# Patient Record
Sex: MALE | Race: WHITE | NOT HISPANIC OR LATINO | Employment: OTHER | URBAN - METROPOLITAN AREA
[De-identification: names, ages, dates, MRNs, and addresses within clinical notes are randomized per-mention and may not be internally consistent; named-entity substitution may affect disease eponyms.]

---

## 2019-01-22 ENCOUNTER — OFFICE VISIT (OUTPATIENT)
Dept: FAMILY MEDICINE | Facility: CLINIC | Age: 49
End: 2019-01-22
Payer: OTHER GOVERNMENT

## 2019-01-22 VITALS
WEIGHT: 240 LBS | HEART RATE: 85 BPM | DIASTOLIC BLOOD PRESSURE: 78 MMHG | RESPIRATION RATE: 18 BRPM | HEIGHT: 69 IN | SYSTOLIC BLOOD PRESSURE: 124 MMHG | BODY MASS INDEX: 35.55 KG/M2 | TEMPERATURE: 99 F

## 2019-01-22 DIAGNOSIS — J30.2 SEASONAL ALLERGIES: ICD-10-CM

## 2019-01-22 DIAGNOSIS — E66.09 CLASS 2 OBESITY DUE TO EXCESS CALORIES WITH BODY MASS INDEX (BMI) OF 35.0 TO 35.9 IN ADULT, UNSPECIFIED WHETHER SERIOUS COMORBIDITY PRESENT: ICD-10-CM

## 2019-01-22 DIAGNOSIS — Z80.42 FAMILY HISTORY OF PROSTATE CANCER: ICD-10-CM

## 2019-01-22 DIAGNOSIS — Z01.89 ENCOUNTER FOR ROUTINE LABORATORY TESTING: ICD-10-CM

## 2019-01-22 DIAGNOSIS — Z12.5 PROSTATE CANCER SCREENING: ICD-10-CM

## 2019-01-22 DIAGNOSIS — Z83.3 FAMILY HISTORY OF DIABETES MELLITUS (DM): ICD-10-CM

## 2019-01-22 DIAGNOSIS — F41.9 ANXIETY: ICD-10-CM

## 2019-01-22 DIAGNOSIS — Z12.11 COLON CANCER SCREENING: ICD-10-CM

## 2019-01-22 DIAGNOSIS — R03.0 PRE-HYPERTENSION: Primary | ICD-10-CM

## 2019-01-22 PROCEDURE — 99999 PR PBB SHADOW E&M-NEW PATIENT-LVL IV: ICD-10-PCS | Mod: PBBFAC,,, | Performed by: INTERNAL MEDICINE

## 2019-01-22 PROCEDURE — 99204 PR OFFICE/OUTPT VISIT, NEW, LEVL IV, 45-59 MIN: ICD-10-PCS | Mod: S$PBB,,, | Performed by: INTERNAL MEDICINE

## 2019-01-22 PROCEDURE — 99999 PR PBB SHADOW E&M-NEW PATIENT-LVL IV: CPT | Mod: PBBFAC,,, | Performed by: INTERNAL MEDICINE

## 2019-01-22 PROCEDURE — 99204 OFFICE O/P NEW MOD 45 MIN: CPT | Mod: PBBFAC,PN | Performed by: INTERNAL MEDICINE

## 2019-01-22 PROCEDURE — 99204 OFFICE O/P NEW MOD 45 MIN: CPT | Mod: S$PBB,,, | Performed by: INTERNAL MEDICINE

## 2019-01-22 NOTE — PATIENT INSTRUCTIONS
Pre-hypertension. Maintain < 2 Gm Na a day diet, and monitor BP; keep a log.  -     Comprehensive metabolic panel; Future; Expected date: 01/22/2019  -     TSH; Future; Expected date: 01/22/2019  -     T4, free; Future; Expected date: 01/22/2019  -     Urinalysis; Future; Expected date: 01/22/2019    Class 2 obesity due to excess calories with body mass index (BMI) of 35.0 to 35.9 in adult, unspecified whether serious comorbidity present; Caloric restriction w regular exercise and weight reduction.  -     Comprehensive metabolic panel; Future; Expected date: 01/22/2019  -     Lipid panel; Future; Expected date: 01/22/2019  -     TSH; Future; Expected date: 01/22/2019  -     T4, free; Future; Expected date: 01/22/2019  -     Hemoglobin A1c; Future; Expected date: 01/22/2019    Family history of diabetes mellitus (DM)  -     Comprehensive metabolic panel; Future; Expected date: 01/22/2019  -     Hemoglobin A1c; Future; Expected date: 01/22/2019  -     Urinalysis; Future; Expected date: 01/22/2019    Seasonal allergies; zyrtec for congestion as needed.    Anxiety; limit caffeine; exercise regularly w stress reduction.   -     TSH; Future; Expected date: 01/22/2019  -     T4, free; Future; Expected date: 01/22/2019    Encounter for routine laboratory testing  -     CBC auto differential; Future; Expected date: 01/22/2019  -     Comprehensive metabolic panel; Future; Expected date: 01/22/2019  -     Lipid panel; Future; Expected date: 01/22/2019  -     TSH; Future; Expected date: 01/22/2019  -     T4, free; Future; Expected date: 01/22/2019  -     Hemoglobin A1c; Future; Expected date: 01/22/2019  -     PSA, Screening; Future; Expected date: 01/22/2019    Colon cancer screening  -     CBC auto differential; Future; Expected date: 01/22/2019  -     Ambulatory consult to Gastroenterology Dr Abbasi for TC initial     Prostate cancer screening  -     PSA, Screening; Future; Expected date: 01/22/2019    Family history of  prostate cancer  -     PSA, Screening; Future; Expected date: 01/22/2019

## 2019-01-22 NOTE — PROGRESS NOTES
Subjective:       Patient ID: Sam Morse is a 48 y.o. male.    Chief Complaint: Establish Care    HPI  Here to get est w me as his PCP; previously was seeing Dr Swartz. PMH/surgical hx delineated and noted. SMH/FMH also delineated and noted.   ROS obtained at length prior to physical being performed. No medications exc low dose ASA almost daily. Occ zyrtec for seasonal allergies.  Situational anxiety; rarely uses med; suspects ativan low dose used rarely.   Seasonal allergies; not problem here lately; zyrtec helps.   Pre-HTN: watches his salt intake; BP here 124/78.  Obesity: BMI: 35.44; exercises at least 2x a week w [employer].   Labs last at [facility] 8/2017. Labs ordered for f/u and GI consult for Tc initial. Total time: 150 pm-258 pm; >50% time spent in discussion, counseling, and review.    Review of Systems   Constitutional: Negative for appetite change and unexpected weight change.   HENT: Negative for congestion, postnasal drip, rhinorrhea and sinus pressure.          seasonal allergies history,    Eyes: Negative for discharge and itching.   Respiratory: Negative for cough, chest tightness, shortness of breath and wheezing.    Cardiovascular: Negative for chest pain, palpitations and leg swelling.   Gastrointestinal: Negative for abdominal pain, blood in stool, constipation, diarrhea, nausea and vomiting.   Endocrine: Negative for polydipsia, polyphagia and polyuria.   Genitourinary: Negative for dysuria and hematuria.   Musculoskeletal: Negative for arthralgias and myalgias.   Skin: Negative for rash.   Allergic/Immunologic: Negative for environmental allergies and food allergies.   Neurological: Negative for tremors, seizures and headaches.   Hematological: Negative for adenopathy. Does not bruise/bleed easily.   Psychiatric/Behavioral:        Some anxiety but no depression.        Objective:      Vitals:    01/22/19 1353   BP: 124/78   Pulse: 85   Resp: 18   Temp: 98.9 °F (37.2 °C)   TempSrc: Oral  "  Weight: 108.8 kg (239 lb 15.5 oz)   Height: 5' 9" (1.753 m)     Body mass index is 35.44 kg/m².    Physical Exam   Constitutional: He is oriented to person, place, and time. He appears well-developed and well-nourished.   HENT:   Head: Normocephalic and atraumatic.   Eyes: EOM are normal.   Neck: Normal range of motion. Neck supple. No thyromegaly present.   No carotid bruits heard   Cardiovascular: Normal rate, regular rhythm and normal heart sounds. Exam reveals no gallop.   No murmur heard.  Pulmonary/Chest: Effort normal and breath sounds normal. No respiratory distress. He has no wheezes. He has no rales.   Abdominal: Soft. Bowel sounds are normal. He exhibits no distension. There is no tenderness. There is no rebound and no guarding.   Musculoskeletal: Normal range of motion. He exhibits no edema.   Lymphadenopathy:     He has no cervical adenopathy.   Neurological: He is alert and oriented to person, place, and time.   Moves all 4 extremities fine.   Skin: No rash noted.   Psychiatric: He has a normal mood and affect. His behavior is normal. Thought content normal.   Vitals reviewed.      Assessment:       1. Pre-hypertension    2. Class 2 obesity due to excess calories with body mass index (BMI) of 35.0 to 35.9 in adult, unspecified whether serious comorbidity present    3. Family history of diabetes mellitus (DM)    4. Seasonal allergies    5. Anxiety    6. Encounter for routine laboratory testing    7. Colon cancer screening    8. Prostate cancer screening    9. Family history of prostate cancer        Plan:       Pre-hypertension. Maintain < 2 Gm Na a day diet, and monitor BP; keep a log.  -     Comprehensive metabolic panel; Future; Expected date: 01/22/2019  -     TSH; Future; Expected date: 01/22/2019  -     T4, free; Future; Expected date: 01/22/2019  -     Urinalysis; Future; Expected date: 01/22/2019    Class 2 obesity due to excess calories with body mass index (BMI) of 35.0 to 35.9 in adult, " unspecified whether serious comorbidity present; Caloric restriction w regular exercise and weight reduction.  -     Comprehensive metabolic panel; Future; Expected date: 01/22/2019  -     Lipid panel; Future; Expected date: 01/22/2019  -     TSH; Future; Expected date: 01/22/2019  -     T4, free; Future; Expected date: 01/22/2019  -     Hemoglobin A1c; Future; Expected date: 01/22/2019    Family history of diabetes mellitus (DM)  -     Comprehensive metabolic panel; Future; Expected date: 01/22/2019  -     Hemoglobin A1c; Future; Expected date: 01/22/2019  -     Urinalysis; Future; Expected date: 01/22/2019    Seasonal allergies; zyrtec for congestion as needed.    Anxiety; limit caffeine; exercise regularly w stress reduction.   -     TSH; Future; Expected date: 01/22/2019  -     T4, free; Future; Expected date: 01/22/2019    Encounter for routine laboratory testing  -     CBC auto differential; Future; Expected date: 01/22/2019  -     Comprehensive metabolic panel; Future; Expected date: 01/22/2019  -     Lipid panel; Future; Expected date: 01/22/2019  -     TSH; Future; Expected date: 01/22/2019  -     T4, free; Future; Expected date: 01/22/2019  -     Hemoglobin A1c; Future; Expected date: 01/22/2019  -     PSA, Screening; Future; Expected date: 01/22/2019    Colon cancer screening  -     CBC auto differential; Future; Expected date: 01/22/2019  -     Ambulatory consult to Gastroenterology Dr Abbasi for TC initial     Prostate cancer screening  -     PSA, Screening; Future; Expected date: 01/22/2019    Family history of prostate cancer  -     PSA, Screening; Future; Expected date: 01/22/2019

## 2019-01-31 ENCOUNTER — TELEPHONE (OUTPATIENT)
Dept: FAMILY MEDICINE | Facility: CLINIC | Age: 49
End: 2019-01-31

## 2019-02-13 ENCOUNTER — LAB VISIT (OUTPATIENT)
Dept: LAB | Facility: HOSPITAL | Age: 49
End: 2019-02-13
Attending: INTERNAL MEDICINE
Payer: OTHER GOVERNMENT

## 2019-02-13 DIAGNOSIS — Z83.3 FAMILY HISTORY OF DIABETES MELLITUS (DM): ICD-10-CM

## 2019-02-13 DIAGNOSIS — R03.0 PRE-HYPERTENSION: ICD-10-CM

## 2019-02-13 LAB
BILIRUB UR QL STRIP: NEGATIVE
CLARITY UR REFRACT.AUTO: CLEAR
COLOR UR AUTO: YELLOW
GLUCOSE UR QL STRIP: NEGATIVE
HGB UR QL STRIP: NEGATIVE
KETONES UR QL STRIP: NEGATIVE
LEUKOCYTE ESTERASE UR QL STRIP: NEGATIVE
NITRITE UR QL STRIP: NEGATIVE
PH UR STRIP: 7 [PH] (ref 5–8)
PROT UR QL STRIP: NEGATIVE
SP GR UR STRIP: 1.02 (ref 1–1.03)
URN SPEC COLLECT METH UR: NORMAL

## 2019-02-13 PROCEDURE — 81003 URINALYSIS AUTO W/O SCOPE: CPT

## 2019-03-13 ENCOUNTER — OFFICE VISIT (OUTPATIENT)
Dept: FAMILY MEDICINE | Facility: CLINIC | Age: 49
End: 2019-03-13
Payer: OTHER GOVERNMENT

## 2019-03-13 VITALS
HEART RATE: 72 BPM | SYSTOLIC BLOOD PRESSURE: 130 MMHG | HEIGHT: 69 IN | BODY MASS INDEX: 35.7 KG/M2 | DIASTOLIC BLOOD PRESSURE: 74 MMHG | WEIGHT: 241.06 LBS

## 2019-03-13 DIAGNOSIS — R73.01 IMPAIRED FASTING BLOOD SUGAR: ICD-10-CM

## 2019-03-13 DIAGNOSIS — J30.2 SEASONAL ALLERGIES: ICD-10-CM

## 2019-03-13 DIAGNOSIS — F41.9 ANXIETY: ICD-10-CM

## 2019-03-13 DIAGNOSIS — Z83.3 FAMILY HISTORY OF DIABETES MELLITUS (DM): ICD-10-CM

## 2019-03-13 DIAGNOSIS — R03.0 PRE-HYPERTENSION: ICD-10-CM

## 2019-03-13 DIAGNOSIS — Z80.8 FAMILY HISTORY OF SKIN CANCER: ICD-10-CM

## 2019-03-13 DIAGNOSIS — E78.5 DYSLIPIDEMIA: ICD-10-CM

## 2019-03-13 DIAGNOSIS — E66.01 CLASS 2 SEVERE OBESITY WITH SERIOUS COMORBIDITY AND BODY MASS INDEX (BMI) OF 35.0 TO 35.9 IN ADULT, UNSPECIFIED OBESITY TYPE: ICD-10-CM

## 2019-03-13 DIAGNOSIS — Z01.89 ENCOUNTER FOR ROUTINE LABORATORY TESTING: ICD-10-CM

## 2019-03-13 DIAGNOSIS — E78.00 PURE HYPERCHOLESTEROLEMIA: Primary | ICD-10-CM

## 2019-03-13 PROCEDURE — 99999 PR PBB SHADOW E&M-EST. PATIENT-LVL III: CPT | Mod: PBBFAC,,, | Performed by: INTERNAL MEDICINE

## 2019-03-13 PROCEDURE — 99999 PR PBB SHADOW E&M-EST. PATIENT-LVL III: ICD-10-PCS | Mod: PBBFAC,,, | Performed by: INTERNAL MEDICINE

## 2019-03-13 PROCEDURE — 99214 PR OFFICE/OUTPT VISIT, EST, LEVL IV, 30-39 MIN: ICD-10-PCS | Mod: S$PBB,,, | Performed by: INTERNAL MEDICINE

## 2019-03-13 PROCEDURE — 99213 OFFICE O/P EST LOW 20 MIN: CPT | Mod: PBBFAC,PN | Performed by: INTERNAL MEDICINE

## 2019-03-13 PROCEDURE — 99214 OFFICE O/P EST MOD 30 MIN: CPT | Mod: S$PBB,,, | Performed by: INTERNAL MEDICINE

## 2019-03-13 RX ORDER — LORAZEPAM 0.5 MG/1
0.5 TABLET ORAL EVERY 12 HOURS PRN
COMMUNITY
End: 2023-07-21

## 2019-03-13 RX ORDER — BUSPIRONE HYDROCHLORIDE 15 MG/1
TABLET ORAL
Qty: 30 TABLET | Refills: 1 | Status: SHIPPED | OUTPATIENT
Start: 2019-03-13 | End: 2020-09-09

## 2019-03-13 NOTE — PROGRESS NOTES
Subjective:       Patient ID: Sam Morse is a 48 y.o. male.    Chief Complaint: Follow-up    HPI  patient here today for reassessment and go over his labs. Labs discussed w pt at length. Has gained 20# in last 18 mos; knows of need for regular exercise; goal 5x a week min 30 min.   Pre hypertension:  Has been advised to watch his salt intake and maintain less than 2 g sodium a day.  Blood pressure here today 130/74.  Anxiety:  Limit his caffeine intake.  Exercise regularly for stress reduction; has used ativan 0.5-1.0 mg BID as needed for anxiety; rarely uses; wulling to try buspar prn.   Pure hypercholesterolemia/ Dyslipidemia; mild reduction HDL noted on his lipid panel at 37.  Cholesterol 186 but .  Impaired fasting blood sugar:  Fasting blood sugar turned back 102 patient with a family medical history of diabetes of note.  Hemoglobin A1c returned back normal however at 5.4.  Obesity stage II with BMI 35.60; regular exercise 5 times a day with minimum 30 min has been advised along with caloric restriction to help his weight come down.  Seasoanl allergies; zyrtec helps a fair amount. Can use flonase if needed as well.   Colon cancer screening.  Has been referred to GI Dr Betancourt for initial total colonoscopy. [facility] neg for colon cancer.     Review of Systems   Constitutional: Negative for appetite change and unexpected weight change.   HENT: Negative for congestion, postnasal drip, rhinorrhea and sinus pressure.          seasonal allergies,    Eyes: Negative for discharge and itching.   Respiratory: Negative for cough, chest tightness, shortness of breath and wheezing.    Cardiovascular: Negative for chest pain, palpitations and leg swelling.   Gastrointestinal: Negative for abdominal pain, blood in stool, constipation, diarrhea, nausea and vomiting.   Endocrine: Negative for polydipsia, polyphagia and polyuria.   Genitourinary: Negative for dysuria and hematuria.   Musculoskeletal: Negative for  "arthralgias and myalgias.   Skin: Negative for rash.   Allergic/Immunologic: Positive for environmental allergies. Negative for food allergies.   Neurological: Negative for tremors, seizures and headaches.   Hematological: Negative for adenopathy. Does not bruise/bleed easily.   Psychiatric/Behavioral:        Doing fine w anxiety. Uses ativan rarely. Willing to try buspar.        Objective:      Vitals:    03/13/19 0956   BP: 130/74   Pulse: 72   Weight: 109.4 kg (241 lb 1.2 oz)   Height: 5' 9" (1.753 m)     Body mass index is 35.6 kg/m².    Physical Exam   Constitutional: He is oriented to person, place, and time. He appears well-developed and well-nourished.   HENT:   Head: Normocephalic and atraumatic.   TM's pink; throat pink; NM swollen, slightly inflamed; clear to yel-white mucus.    Eyes: EOM are normal.   Neck: Normal range of motion. Neck supple. No thyromegaly present.   Cardiovascular: Normal rate, regular rhythm and normal heart sounds. Exam reveals no gallop.   No murmur heard.  Pulmonary/Chest: Effort normal and breath sounds normal. No respiratory distress. He has no wheezes. He has no rales.   Abdominal: Soft. Bowel sounds are normal. He exhibits no distension. There is no tenderness. There is no rebound and no guarding.   Musculoskeletal: Normal range of motion. He exhibits no edema.   Lymphadenopathy:     He has no cervical adenopathy.   Neurological: He is alert and oriented to person, place, and time.   Moves all 4 extremities fine.   Skin: No rash noted.   Psychiatric: He has a normal mood and affect. His behavior is normal. Thought content normal.   Vitals reviewed.      Assessment:       1. Pure hypercholesterolemia    2. Dyslipidemia    3. Encounter for routine laboratory testing    4. Pre-hypertension    5. Anxiety    6. Impaired fasting blood sugar    7. Family history of diabetes mellitus (DM)    8. Class 2 severe obesity with serious comorbidity and body mass index (BMI) of 35.0 to 35.9 " in adult, unspecified obesity type    9. Seasonal allergies    10. Family history of skin cancer        Plan:       Pure hypercholesterolemia.Maintain low fat high fiber diet, exercise regularly.  -     Lipid panel; Future; Expected date: 03/13/2019    Dyslipidemia  -     Lipid panel; Future; Expected date: 03/13/2019    Encounter for routine laboratory testing    Pre-hypertension. Maintain < 2 Gm Na a day diet, and monitor BP ; keep a log.    Anxiety; exercise regularly and limit caffeine.   -     busPIRone (BUSPAR) 15 MG tablet; 1/3-1/2 of 15 mg po TID as needed for anxiety.  Dispense: 30 tablet; Refill: 1    Impaired fasting blood sugar. Exercise recommended with weight reduction and low carb diet; we'll follow hemoglobin A1c's with you periodically.  -     Glucose, fasting; Future; Expected date: 03/27/2019    Family history of diabetes mellitus (DM)    Class 2 severe obesity with serious comorbidity and body mass index (BMI) of 35.0 to 35.9 in adult, unspecified obesity type. Caloric restriction w regular exercise and weight reduction.    Seasonal allergies; zyrtec and flonase as directed for congestion; Simply Saline for irrigation    Family history of skin cancer  -     Ambulatory referral to Dermatology Dr AL[facility] Marie for evaluation and treatment if needed. F/u w him as directed; needs visit updated.

## 2019-03-13 NOTE — PATIENT INSTRUCTIONS
Pure hypercholesterolemia.Maintain low fat high fiber diet, exercise regularly.  -     Lipid panel; Future; Expected date: 03/13/2019    Dyslipidemia  -     Lipid panel; Future; Expected date: 03/13/2019    Encounter for routine laboratory testing    Pre-hypertension. Maintain < 2 Gm Na a day diet, and monitor BP ; keep a log.    Anxiety; exercise regularly and limit caffeine.   -     busPIRone (BUSPAR) 15 MG tablet; 1/3-1/2 of 15 mg po TID as needed for anxiety.  Dispense: 30 tablet; Refill: 1    Impaired fasting blood sugar. Exercise recommended with weight reduction and low carb diet; we'll follow hemoglobin A1c's with you periodically.  -     Glucose, fasting; Future; Expected date: 03/27/2019    Family history of diabetes mellitus (DM)    Class 2 severe obesity with serious comorbidity and body mass index (BMI) of 35.0 to 35.9 in adult, unspecified obesity type. Caloric restriction w regular exercise and weight reduction.    Seasonal allergies; zyrtec and flonase as directed for congestion; Simply Saline for irrigation    Family history of skin cancer  -     Ambulatory referral to Dermatology Dr [facility] Marie for evaluation and treatment if needed. F/u w him as directed; needs visit updated.

## 2019-06-17 ENCOUNTER — HOSPITAL ENCOUNTER (OUTPATIENT)
Facility: HOSPITAL | Age: 49
Discharge: HOME | End: 2019-06-17
Attending: INTERNAL MEDICINE | Admitting: INTERNAL MEDICINE
Payer: OTHER GOVERNMENT

## 2019-06-17 ENCOUNTER — ANESTHESIA EVENT (OUTPATIENT)
Dept: ENDOSCOPY | Facility: HOSPITAL | Age: 49
End: 2019-06-17
Payer: OTHER GOVERNMENT

## 2019-06-17 ENCOUNTER — ANESTHESIA (OUTPATIENT)
Dept: ENDOSCOPY | Facility: HOSPITAL | Age: 49
End: 2019-06-17
Payer: OTHER GOVERNMENT

## 2019-06-17 DIAGNOSIS — Z12.11 COLON CANCER SCREENING: ICD-10-CM

## 2019-06-17 PROCEDURE — 25000003 PHARM REV CODE 250: Mod: PO | Performed by: INTERNAL MEDICINE

## 2019-06-17 PROCEDURE — D9220A PRA ANESTHESIA: ICD-10-PCS | Mod: 33,ANES,, | Performed by: ANESTHESIOLOGY

## 2019-06-17 PROCEDURE — 37000009 HC ANESTHESIA EA ADD 15 MINS: Mod: PO | Performed by: INTERNAL MEDICINE

## 2019-06-17 PROCEDURE — 88305 TISSUE EXAM BY PATHOLOGIST: CPT | Mod: 26,,, | Performed by: PATHOLOGY

## 2019-06-17 PROCEDURE — 45385 COLONOSCOPY W/LESION REMOVAL: CPT | Mod: PO | Performed by: INTERNAL MEDICINE

## 2019-06-17 PROCEDURE — 45385 COLONOSCOPY W/LESION REMOVAL: CPT | Mod: 33,,, | Performed by: INTERNAL MEDICINE

## 2019-06-17 PROCEDURE — D9220A PRA ANESTHESIA: ICD-10-PCS | Mod: 33,CRNA,, | Performed by: NURSE ANESTHETIST, CERTIFIED REGISTERED

## 2019-06-17 PROCEDURE — 00811 ANES LWR INTST NDSC NOS: CPT | Mod: PO | Performed by: INTERNAL MEDICINE

## 2019-06-17 PROCEDURE — 45385 PR COLONOSCOPY,REMV LESN,SNARE: ICD-10-PCS | Mod: 33,,, | Performed by: INTERNAL MEDICINE

## 2019-06-17 PROCEDURE — 63600175 PHARM REV CODE 636 W HCPCS: Mod: PO | Performed by: NURSE ANESTHETIST, CERTIFIED REGISTERED

## 2019-06-17 PROCEDURE — 37000008 HC ANESTHESIA 1ST 15 MINUTES: Mod: PO | Performed by: INTERNAL MEDICINE

## 2019-06-17 PROCEDURE — D9220A PRA ANESTHESIA: Mod: 33,CRNA,, | Performed by: NURSE ANESTHETIST, CERTIFIED REGISTERED

## 2019-06-17 PROCEDURE — D9220A PRA ANESTHESIA: Mod: 33,ANES,, | Performed by: ANESTHESIOLOGY

## 2019-06-17 PROCEDURE — 88305 TISSUE EXAM BY PATHOLOGIST: CPT | Performed by: PATHOLOGY

## 2019-06-17 PROCEDURE — 27201089 HC SNARE, DISP (ANY): Mod: PO | Performed by: INTERNAL MEDICINE

## 2019-06-17 PROCEDURE — 88305 TISSUE SPECIMEN TO PATHOLOGY - SURGERY: ICD-10-PCS | Mod: 26,,, | Performed by: PATHOLOGY

## 2019-06-17 RX ORDER — LIDOCAINE HYDROCHLORIDE 10 MG/ML
1 INJECTION INFILTRATION; PERINEURAL ONCE
Status: COMPLETED | OUTPATIENT
Start: 2019-06-17 | End: 2019-06-17

## 2019-06-17 RX ORDER — LIDOCAINE HCL/PF 100 MG/5ML
SYRINGE (ML) INTRAVENOUS
Status: DISCONTINUED | OUTPATIENT
Start: 2019-06-17 | End: 2019-06-17

## 2019-06-17 RX ORDER — SODIUM CHLORIDE, SODIUM LACTATE, POTASSIUM CHLORIDE, CALCIUM CHLORIDE 600; 310; 30; 20 MG/100ML; MG/100ML; MG/100ML; MG/100ML
INJECTION, SOLUTION INTRAVENOUS CONTINUOUS
Status: DISCONTINUED | OUTPATIENT
Start: 2019-06-17 | End: 2019-06-17 | Stop reason: HOSPADM

## 2019-06-17 RX ORDER — PROPOFOL 10 MG/ML
VIAL (ML) INTRAVENOUS
Status: DISCONTINUED | OUTPATIENT
Start: 2019-06-17 | End: 2019-06-17

## 2019-06-17 RX ORDER — SODIUM CHLORIDE 0.9 % (FLUSH) 0.9 %
10 SYRINGE (ML) INJECTION
Status: DISCONTINUED | OUTPATIENT
Start: 2019-06-17 | End: 2019-06-17 | Stop reason: HOSPADM

## 2019-06-17 RX ADMIN — PROPOFOL 50 MG: 10 INJECTION, EMULSION INTRAVENOUS at 10:06

## 2019-06-17 RX ADMIN — PROPOFOL 100 MG: 10 INJECTION, EMULSION INTRAVENOUS at 10:06

## 2019-06-17 RX ADMIN — SODIUM CHLORIDE, SODIUM LACTATE, POTASSIUM CHLORIDE, AND CALCIUM CHLORIDE: .6; .31; .03; .02 INJECTION, SOLUTION INTRAVENOUS at 10:06

## 2019-06-17 RX ADMIN — LIDOCAINE HYDROCHLORIDE: 10 INJECTION, SOLUTION EPIDURAL; INFILTRATION; INTRACAUDAL; PERINEURAL at 10:06

## 2019-06-17 RX ADMIN — LIDOCAINE HYDROCHLORIDE 100 MG: 20 INJECTION, SOLUTION INTRAVENOUS at 10:06

## 2019-06-17 NOTE — PLAN OF CARE
Patient tolerating PO intake well. No distress/discomfort noted. Vital signs stable. Discharge instructions reviewed with patient/family. Verbalized understanding. Patient ready for discharge.

## 2019-06-17 NOTE — BRIEF OP NOTE
Discharge Note  Short Stay      SUMMARY     Admit Date: 6/17/2019    Attending Physician: Sylvain Abbasi Jr., MD     Discharge Physician: Sylvain Abbasi Jr., MD    Discharge Date: 6/17/2019 11:06 AM    Final Diagnosis: Screen for colon cancer [Z12.11]  Impression:          - Two 2 to 3 mm polyps in the distal sigmoid colon,                        removed with a cold snare. Resected and retrieved.                       - Mild colonic spasm consistent with irritable bowel                        syndrome.                       - Non-bleeding internal hemorrhoids.                       - The examination was otherwise normal.                       - The examined portion of the ileum was normal.  Recommendation:      - Discharge patient to home.                       - Await pathology results.                       - If the pathology report reveals adenomatous                        tissue, then repeat the colonoscopy for surveillance                        in 5 years.                       - If the pathology report indicates hyperplastic                        polyp, then repeat colonoscopy for surveillance in 7                        years.                       - High fiber diet.                       - Use fiber, for example Citrucel, Fibercon, Konsyl                        or Metamucil.                       - Call the [facility] clinic in 2 weeks for reports (if                        you haven't heard from us sooner) [phone].                       - Continue present medications.                       - Patient has a contact number available for                        emergencies. The signs and symptoms of potential                        delayed complications were discussed with the                        patient. Return to normal activities tomorrow.                        Written discharge instructions were provided to the                        patient.                       - Return to normal activities  tomorrow.  Sylvain Abbasi MD  6/17/2019  Disposition: HOME OR SELF CARE    Patient Instructions:   Current Discharge Medication List      CONTINUE these medications which have NOT CHANGED    Details   busPIRone (BUSPAR) 15 MG tablet 1/3-1/2 of 15 mg po TID as needed for anxiety.  Qty: 30 tablet, Refills: 1    Associated Diagnoses: Anxiety      LORazepam (ATIVAN) 0.5 MG tablet Take 0.5 mg by mouth every 12 (twelve) hours as needed for Anxiety.             Discharge Procedure Orders (must include Diet, Follow-up, Activity)    Follow Up:  Follow up with PCP as per your routine.  Please follow a high fiber diet.  Activity as tolerated.    No driving day of procedure.

## 2019-06-17 NOTE — H&P
History & Physical - Short Stay  Gastroenterology      SUBJECTIVE:     Procedure: Colonoscopy    Chief Complaint/Indication for Procedure: Screening    History of Present Illness:  Asymptomatic  Office Visit     1/22/2019  [facility]      Aidan Servin MD   Internal Medicine   Pre-hypertension +8 more   Dx   Establish Care ; Referred by Aaareferral Self   Reason for Visit           Progress Notes     Expand All Collapse All    Subjective:       Patient ID: Sam Morse is a 48 y.o. male.     Chief Complaint: Establish Care     HPI  Here to get est saba schulte as his PCP; previously was seeing Dr Swartz. PMH/surgical hx delineated and noted. SMH/FMH also delineated and noted.   ROS obtained at length prior to physical being performed. No medications exc low dose ASA almost daily. Occ zyrtec for seasonal allergies.  Situational anxiety; rarely uses med; suspects ativan low dose used rarely.   Seasonal allergies; not problem here lately; zyrtec helps.   Pre-HTN: watches his salt intake; BP here 124/78.  Obesity: BMI: 35.44; exercises at least 2x a week w [employer].   Labs last at [facility] 8/2017. Labs ordered for f/u and GI consult for Tc initial. Total time: 150 pm-258 pm; >50% time spent in discussion, counseling, and review.     Assessment:       1. Pre-hypertension    2. Class 2 obesity due to excess calories with body mass index (BMI) of 35.0 to 35.9 in adult, unspecified whether serious comorbidity present    3. Family history of diabetes mellitus (DM)    4. Seasonal allergies    5. Anxiety    6. Encounter for routine laboratory testing    7. Colon cancer screening    8. Prostate cancer screening    9. Family history of prostate cancer        Plan:       Pre-hypertension. Maintain < 2 Gm Na a day diet, and monitor BP; keep a log.  -     Comprehensive metabolic panel; Future; Expected date: 01/22/2019  -     TSH; Future; Expected date: 01/22/2019  -     T4, free; Future; Expected date:  01/22/2019  -     Urinalysis; Future; Expected date: 01/22/2019     Class 2 obesity due to excess calories with body mass index (BMI) of 35.0 to 35.9 in adult, unspecified whether serious comorbidity present; Caloric restriction w regular exercise and weight reduction.  -     Comprehensive metabolic panel; Future; Expected date: 01/22/2019  -     Lipid panel; Future; Expected date: 01/22/2019  -     TSH; Future; Expected date: 01/22/2019  -     T4, free; Future; Expected date: 01/22/2019  -     Hemoglobin A1c; Future; Expected date: 01/22/2019     Family history of diabetes mellitus (DM)  -     Comprehensive metabolic panel; Future; Expected date: 01/22/2019  -     Hemoglobin A1c; Future; Expected date: 01/22/2019  -     Urinalysis; Future; Expected date: 01/22/2019     Seasonal allergies; zyrtec for congestion as needed.     Anxiety; limit caffeine; exercise regularly w stress reduction.   -     TSH; Future; Expected date: 01/22/2019  -     T4, free; Future; Expected date: 01/22/2019     Encounter for routine laboratory testing  -     CBC auto differential; Future; Expected date: 01/22/2019  -     Comprehensive metabolic panel; Future; Expected date: 01/22/2019  -     Lipid panel; Future; Expected date: 01/22/2019  -     TSH; Future; Expected date: 01/22/2019  -     T4, free; Future; Expected date: 01/22/2019  -     Hemoglobin A1c; Future; Expected date: 01/22/2019  -     PSA, Screening; Future; Expected date: 01/22/2019     Colon cancer screening  -     CBC auto differential; Future; Expected date: 01/22/2019  -     Ambulatory consult to Gastroenterology Dr Abbasi for TC initial      Prostate cancer screening  -     PSA, Screening; Future; Expected date: 01/22/2019     Family history of prostate cancer  -     PSA, Screening; Future; Expected date: 01/22/2019             PTA Medications   Medication Sig    busPIRone (BUSPAR) 15 MG tablet 1/3-1/2 of 15 mg po TID as needed for anxiety.    LORazepam (ATIVAN) 0.5 MG  "tablet Take 0.5 mg by mouth every 12 (twelve) hours as needed for Anxiety.       Review of patient's allergies indicates:  No Known Allergies     Past Medical History:   Diagnosis Date    Anxiety     Obesity (BMI 30-39.9)     Pre-hypertension     Seasonal allergies      Past Surgical History:   Procedure Laterality Date    LASIK Bilateral 2012    VASECTOMY  [id number]    WISDOM TOOTH EXTRACTION  [id number]    all 4.      Family History   Problem Relation Age of Onset    Cancer Father         malignant melanoma; prostate cancer    Depression Father         anxiety w depression    Heart disease Father         CAD at 48-49 dx; coronary stent at 49.    Hypertension Father     Diabetes Paternal Grandmother     Hypertension Paternal Grandmother     Diabetes Paternal Grandfather     Cancer Paternal Grandfather         prostate cancer     Social History     Tobacco Use    Smoking status: Never Smoker    Smokeless tobacco: Never Used   Substance Use Topics    Alcohol use: Yes     Comment: rare occasions    Drug use: No         OBJECTIVE:     Vital Signs (Most Recent)  Temp: 97.5 °F (36.4 °C) (06/17/19 0948)  Pulse: (!) 56 (06/17/19 0948)  Resp: 16 (06/17/19 0948)  BP: 131/75 (06/17/19 0948)  SpO2: 96 % (06/17/19 0948)    Physical Exam:  :Ht 5' 9" (1.753 m)   Wt 108.8 kg (239 lb 15.5 oz)   BMI 35.44 kg/m²                                                         GENERAL:  Comfortable, in no acute distress.                      HEENT EXAM:  Nonicteric.  No adenopathy.  Oropharynx is clear.               NECK:  Supple.                                                               LUNGS:  Clear.                                                               CARDIAC:  Regular rate and rhythm.  S1, S2.  No murmur.  ABDOMEN:  Obese.  Soft, positive bowel sounds, nontender.  No hepatosplenomegaly or masses.  No rebound or guarding.     EXTREMITIES:  No edema.     MENTAL STATUS:  Alert and oriented.    ASSESSMENT/PLAN: "     Assessment: Colorectal cancer screening    Plan: Colonoscopy    Anesthesia Plan:   MAC / General Anaesthesia    ASA Grade: ASA 2 - Patient with mild systemic disease with no functional limitations    MALLAMPATI SCORE: II (hard and soft palate, upper portion of tonsils anduvula visible)

## 2019-06-17 NOTE — ANESTHESIA PREPROCEDURE EVALUATION
06/17/2019  Sam Morse is a 48 y.o., male.    Anesthesia Evaluation    I have reviewed the Patient Summary Reports.    I have reviewed the Nursing Notes.      Review of Systems  Anesthesia Hx:  No problems with previous Anesthesia    Cardiovascular:  Cardiovascular Normal     Pulmonary:  Pulmonary Normal        Physical Exam  General:  Well nourished, Obesity    Airway/Jaw/Neck:  Airway Findings: Mouth Opening: Normal Tongue: Normal  Mallampati: II  TM Distance: Normal, at least 6 cm  Jaw/Neck Findings:  Neck ROM: Normal ROM     Eyes/Ears/Nose:  Eyes/Ears/Nose Findings:    Dental:  Dental Findings: In tact   Chest/Lungs:  Chest/Lungs Findings: Normal Respiratory Rate     Heart/Vascular:  Heart Findings: Rate: Normal  Rhythm: Regular Rhythm        Mental Status:  Mental Status Findings:  Cooperative, Alert and Oriented         Anesthesia Plan  Type of Anesthesia, risks & benefits discussed:  Anesthesia Type:  general  Patient's Preference: General  Intra-op Monitoring Plan: standard ASA monitors  Intra-op Monitoring Plan Comments:   Post Op Pain Control Plan:   Post Op Pain Control Plan Comments:   Induction:   IV  Beta Blocker:  Patient is not currently on a Beta-Blocker (No further documentation required).       Informed Consent: Patient understands risks and agrees with Anesthesia plan.  Questions answered. Anesthesia consent signed with patient.  ASA Score: 2     Day of Surgery Review of History & Physical:    H&P update referred to the surgeon.         Ready For Surgery From Anesthesia Perspective.

## 2019-06-17 NOTE — [employer] PATIENT INSTRUCTIONS
Discharge Summary/Instructions for after Colonoscopy with   Biopsy/Polypectomy  Sam Morse    Monday, June 17, 2019  Sylvain Abbasi MD  RESTRICTIONS ON ACTIVITY:  - Do not drive a car or operate machinery until the day after the procedure.      - The following day: return to full activity including work.  - For  3 days: No heavy lifting, straining or running.  - Diet: You can have solid foods, but no gassy foods (i.e. beans, broccoli,   cabbage, etc).  TREATMENT FOR COMMON SIDE EFFECTS:  - Mild abdominal pain and bloating or excessive gas: rest, eat lightly and   use a heating pad.  SYMPTOMS TO WATCH FOR AND REPORT TO YOUR PHYSICIAN:  1. Severe abdominal pain.  2. Fever within 24 hours after a procedure.  3. A large amount of rectal bleeding. (A small amount of blood from the   rectum is not serious, especially if hemorrhoids are present.  3.  Because air was put into your colon during the procedure, expelling   large amounts of air from your rectum is normal.  4.  You may not have a bowel movement for 1-3 days because of the   colonoscopy prep.  This is normal.  5.  Call immediately if you notice any of the following:   Chills and/or fever over 101   Persistent vomiting   Severe abdominal pain, other than gas cramps   Severe chest pain   Black, tarry stools   Any bleeding - exceeding one tablespoon  Your doctor recommends these additional instructions:  We are waiting for your pathology results.   If the pathology report reveals adenomatous (precancerous) tissue, then your   physician recommends a repeat colonoscopy for surveillance in 5 years.   If the pathology report indicates a hyperplastic polyp, then the colonoscopy   will be repeated for surveillance in 7 years.   Eat a high fiber diet.   Take a fiber supplement, for example Citrucel, Fibercon, Konsyl or   Metamucil.   Call the [facility] clinic in 2 weeks for reports (if you haven't heard from us   sooner)  [phone].  None  If you have any questions  or problems, please call your physician.  EMERGENCY PHONE NUMBER: [phone]  LAB RESULTS: Call in two (2) weeks for lab results, [phone]  ___________________________________________  Nurse Signature  ___________________________________________  Patient/Designated Responsible Party Signature  Sylvain Abbasi MD  6/17/2019 11:04:28 AM  This report has been verified and signed electronically.  [employer]

## 2019-06-17 NOTE — DISCHARGE INSTRUCTIONS
Recovery After Procedural Sedation (Adult)  You have been given medicine by vein to make you sleep during your surgery. This may have included both a pain medicine and sleeping medicine. Most of the effects have worn off. But you may still have some drowsiness for the next 6 to 8 hours.  Home care  Follow these guidelines when you get home:  · For the next 8 hours, you should be watched by a responsible adult. This person should make sure your condition is not getting worse.  · Don't drink any alcohol for the next 24 hours.  · Don't drive, operate dangerous machinery, or make important business or personal decisions during the next 24 hours.  Note: Your healthcare provider may tell you not to take any medicine by mouth for pain or sleep in the next 4 hours. These medicines may react with the medicines you were given in the hospital. This could cause a much stronger response than usual.  Follow-up care  Follow up with your healthcare provider if you are not alert and back to your usual level of activity within 12 hours.  When to seek medical advice  Call your healthcare provider right away if any of these occur:  · Drowsiness gets worse  · Weakness or dizziness gets worse  · Repeated vomiting  · You can't be awakened   Date Last Reviewed: 10/18/2016  © 7044-5667 [employer]. [address] [place] [address], [place] [address]. All rights reserved. This information is not intended as a substitute for professional medical care. Always follow your healthcare professional's instructions.        High-Fiber Diet  Fiber is in fruits, vegetables, cereals, and grains. Fiber passes through your body undigested. A high-fiber diet helps food move through your intestinal tract. The added bulk is helpful in preventing constipation. In people with diverticulosis, fiber helps clean out the pouches along the colon wall. It also prevents new pouches from forming. A high-fiber diet reduces the risk of colon cancer. It also lowers  blood cholesterol and prevents high blood sugar in people with diabetes.    The fiber-rich foods listed below should be part of your diet. If you are not used to high-fiber foods, start with 1 or 2 foods from this list. Every 3 to 4 days add a new one to your diet. Do this until you are eating 4 high-fiber foods per day. This should give you 20 to 35 grams of fiber a day. It is also important to drink a lot of water when you are on this diet. You should have 6 to 8 glasses of water a day. Water makes the fiber swell and increases the benefit.  Foods high in dietary fiber  The following foods are high in dietary fiber:  · Breads. Breads made with 100% whole-wheat flour; jagdeep, wheat, or rye crackers; whole-grain tortillas, bran muffins.  · Cereals. Whole-grain and bran cereals with bran (shredded wheat, wheat flakes, raisin bran, corn bran); oatmeal, rolled oats, granola, and brown rice.  · Fruits. Fresh fruits and their edible skins (pears, prunes, raisins, berries, apples, and apricots); bananas, citrus fruit, mangoes, pineapple; and prune juice.  · Nuts. Any nuts and seeds.  · Vegetables. Best served raw or lightly cooked. All types, especially: green peas, celery, eggplant, potatoes, spinach, broccoli, Alpine sprouts, winter squash, carrots, cauliflower, soybeans, lentils, and fresh and dried beans of all kinds.  · Other. Popcorn, any spices.  Date Last Reviewed: 8/1/2016  © 7903-1590 [employer]. [address] [place] [address], [place] [address]. All rights reserved. This information is not intended as a substitute for professional medical care. Always follow your healthcare professional's instructions.

## 2019-06-17 NOTE — ANESTHESIA POSTPROCEDURE EVALUATION
Anesthesia Post Evaluation    Patient: Sam Morse    Procedure(s) Performed: Procedure(s) (LRB):  COLONOSCOPY (N/A)    Final Anesthesia Type: general  Patient location during evaluation: PACU  Patient participation: Yes- Able to Participate  Level of consciousness: awake and alert, oriented and awake  Post-procedure vital signs: reviewed and stable  Pain management: adequate  Airway patency: patent  PONV status at discharge: No PONV  Anesthetic complications: no      Cardiovascular status: blood pressure returned to baseline and hemodynamically stable  Respiratory status: unassisted, spontaneous ventilation and room air  Hydration status: euvolemic  Follow-up not needed.          Vitals Value Taken Time   /71 6/17/2019 11:28 AM   Temp 36.4 °C (97.6 °F) 6/17/2019 10:58 AM   Pulse 60 6/17/2019 11:28 AM   Resp 14 6/17/2019 11:28 AM   SpO2 98 % 6/17/2019 11:28 AM         Event Time     Out of Recovery 11:13:00          Pain/Abdoulaye Score: Abdoulaye Score: 10 (6/17/2019 11:28 AM)

## 2019-06-17 NOTE — TRANSFER OF CARE
"Anesthesia Transfer of Care Note    Patient: Sam Morse    Procedure(s) Performed: Procedure(s) (LRB):  COLONOSCOPY (N/A)    Patient location: PACU    Anesthesia Type: general    Transport from OR: Transported from OR on room air with adequate spontaneous ventilation    Post pain: adequate analgesia    Post assessment: no apparent anesthetic complications and tolerated procedure well    Post vital signs: stable    Level of consciousness: awake, alert and oriented    Nausea/Vomiting: no nausea/vomiting    Complications: none    Transfer of care protocol was followed      Last vitals:   Visit Vitals  /75 (BP Location: Right arm, Patient Position: Lying)   Pulse (!) 56   Temp 36.4 °C (97.5 °F) (Skin)   Resp 16   Ht 5' 8" (1.727 m)   Wt 102.1 kg (225 lb)   SpO2 96%   BMI 34.21 kg/m²     "

## 2019-06-18 VITALS
DIASTOLIC BLOOD PRESSURE: 71 MMHG | OXYGEN SATURATION: 98 % | HEIGHT: 68 IN | SYSTOLIC BLOOD PRESSURE: 129 MMHG | BODY MASS INDEX: 34.1 KG/M2 | TEMPERATURE: 98 F | HEART RATE: 60 BPM | WEIGHT: 225 LBS | RESPIRATION RATE: 14 BRPM

## 2019-09-23 ENCOUNTER — TELEPHONE (OUTPATIENT)
Dept: FAMILY MEDICINE | Facility: CLINIC | Age: 49
End: 2019-09-23

## 2019-09-23 DIAGNOSIS — Z80.8 FAMILY HISTORY OF SKIN CANCER: Primary | ICD-10-CM

## 2019-09-23 NOTE — TELEPHONE ENCOUNTER
----- Message from Qian Aleman sent at 9/23/2019  4:13 PM CDT -----  Contact: patient  Type: Needs Medical Advice    Who Called:  patient  Symptoms (please be specific):  na  How long has patient had these symptoms:  kerry  Pharmacy name and phone #:  [facility]  Best Call Back Number: [phone]  Additional Information: Patient states to please send referral to Dr. Marie's office Dermatology, so patient can make apt.  Please call to advise,Thanks!

## 2019-09-25 NOTE — TELEPHONE ENCOUNTER
Please notify patient that at the time of his visit on 03/13/2019 an external consult was placed to dermatologist Dr Josh Marie at that time for him to schedule appointment for regular evaluation; this can be found in the epic system.  Please call me back if I can be of any further assistance.  Please call their office to schedule an appointment for continue regular evaluations

## 2020-01-15 ENCOUNTER — OFFICE VISIT (OUTPATIENT)
Dept: FAMILY MEDICINE | Facility: CLINIC | Age: 50
End: 2020-01-15
Payer: OTHER GOVERNMENT

## 2020-01-15 VITALS
SYSTOLIC BLOOD PRESSURE: 134 MMHG | DIASTOLIC BLOOD PRESSURE: 86 MMHG | OXYGEN SATURATION: 97 % | BODY MASS INDEX: 36.55 KG/M2 | WEIGHT: 241.19 LBS | TEMPERATURE: 99 F | HEIGHT: 68 IN | HEART RATE: 79 BPM

## 2020-01-15 DIAGNOSIS — J30.2 SEASONAL ALLERGIES: ICD-10-CM

## 2020-01-15 DIAGNOSIS — F41.9 ANXIETY: ICD-10-CM

## 2020-01-15 DIAGNOSIS — Z12.11 COLON CANCER SCREENING: ICD-10-CM

## 2020-01-15 DIAGNOSIS — R73.01 IMPAIRED FASTING BLOOD SUGAR: ICD-10-CM

## 2020-01-15 DIAGNOSIS — Z12.5 PROSTATE CANCER SCREENING: ICD-10-CM

## 2020-01-15 DIAGNOSIS — D12.6 TUBULAR ADENOMA OF COLON: Primary | ICD-10-CM

## 2020-01-15 DIAGNOSIS — E78.5 DYSLIPIDEMIA: ICD-10-CM

## 2020-01-15 DIAGNOSIS — E66.01 CLASS 2 SEVERE OBESITY DUE TO EXCESS CALORIES WITH SERIOUS COMORBIDITY AND BODY MASS INDEX (BMI) OF 36.0 TO 36.9 IN ADULT: ICD-10-CM

## 2020-01-15 DIAGNOSIS — Z82.49 FAMILY HISTORY OF HEART DISEASE: ICD-10-CM

## 2020-01-15 DIAGNOSIS — L98.9 SKIN LESION OF CHEST WALL: ICD-10-CM

## 2020-01-15 DIAGNOSIS — R03.0 PRE-HYPERTENSION: ICD-10-CM

## 2020-01-15 DIAGNOSIS — E78.49 OTHER HYPERLIPIDEMIA: ICD-10-CM

## 2020-01-15 PROCEDURE — 99214 PR OFFICE/OUTPT VISIT, EST, LEVL IV, 30-39 MIN: ICD-10-PCS | Mod: S$PBB,,, | Performed by: INTERNAL MEDICINE

## 2020-01-15 PROCEDURE — 99999 PR PBB SHADOW E&M-EST. PATIENT-LVL III: ICD-10-PCS | Mod: PBBFAC,,, | Performed by: INTERNAL MEDICINE

## 2020-01-15 PROCEDURE — 99214 OFFICE O/P EST MOD 30 MIN: CPT | Mod: S$PBB,,, | Performed by: INTERNAL MEDICINE

## 2020-01-15 PROCEDURE — 99213 OFFICE O/P EST LOW 20 MIN: CPT | Mod: PBBFAC,PN | Performed by: INTERNAL MEDICINE

## 2020-01-15 PROCEDURE — 99999 PR PBB SHADOW E&M-EST. PATIENT-LVL III: CPT | Mod: PBBFAC,,, | Performed by: INTERNAL MEDICINE

## 2020-01-15 NOTE — PROGRESS NOTES
Subjective:       Patient ID: Sam Morse is a 49 y.o. male.    Chief Complaint: Follow-up    HPI  Here today for f/u.  Reassessment to include needed labs.  Labs not obtained for follow-up.  Left chest wall lesion w initial bx atypical in result; deeper excision done and f/u path pending; pt to f/u w derm for final pathology results. Stitches out in 10 days; w 6 mos f/u at 2020.  Encounter for routine labs ordered; chart reviewed  Other hyperlipidemia:  Aware the need for low-fat high-fiber diet regular exercise and weight reduction.  Lipid profile needs to be updated.  Dyslipidemia:  Low-fat high-fiber diet and regular exercise recommended.  Along with weight reduction.  Lipid profile to be repeated to reassess HDL as well on follow-up  Family history of heart disease:  Dad with a coronary stent at age 48-49.    Pre hypertension:  Blood pressure here 134/86 reportedly feeling good; knows he needs to watch his salt intake.  Blood pressure upon prior office review less than equal to 130 over 80  Colon cancer screenin2019 total colonoscopy by Dr. Abbasi; 2 sigmoid colon polyps removed.  Pathology:  Fragments of tubular adenoma and hyperplastic polyp; total colonoscopy to be repeated as per GI in 5 years.  Anxiety:  Overall doing a lot better; work is better; kit to doing better as well.  Knows to exercise regularly and limit caffeine  Prostate cancer screening:  After 2020 will need PSA repeated      Review of Systems   Constitutional: Negative for fever and unexpected weight change.   HENT: Negative for congestion, postnasal drip and rhinorrhea.    Respiratory: Negative for cough, chest tightness, shortness of breath and wheezing.    Cardiovascular: Negative for chest pain, palpitations and leg swelling.   Gastrointestinal: Negative for abdominal pain, blood in stool, constipation, diarrhea, nausea and vomiting.   Genitourinary: Negative for dysuria and hematuria.   Musculoskeletal: Negative  "for arthralgias and myalgias.   Skin: Negative for rash.   Allergic/Immunologic: Negative for environmental allergies and food allergies.   Neurological: Negative for syncope and weakness.   Psychiatric/Behavioral: Negative for dysphoric mood. The patient is not nervous/anxious.        Objective:      Vitals:    01/15/20 1035   BP: 134/86   BP Location: Left arm   Patient Position: Sitting   BP Method: Large (Manual)   Pulse: 79   Temp: 98.5 °F (36.9 °C)   TempSrc: Oral   SpO2: 97%   Weight: 109.4 kg (241 lb 2.9 oz)   Height: 5' 8" (1.727 m)     Body mass index is 36.67 kg/m².    Physical Exam   Constitutional: He is oriented to person, place, and time. He appears well-developed and well-nourished.   HENT:   Head: Normocephalic and atraumatic.   Eyes: EOM are normal.   Neck: Normal range of motion. Neck supple. No thyromegaly present.   No carotid bruits heard   Cardiovascular: Normal rate, regular rhythm and normal heart sounds. Exam reveals no gallop.   No murmur heard.  Pulmonary/Chest: Effort normal and breath sounds normal. No respiratory distress. He has no wheezes. He has no rales.   Abdominal: Soft. Bowel sounds are normal. He exhibits no distension. There is no tenderness. There is no rebound and no guarding.   Musculoskeletal: Normal range of motion. He exhibits no edema.   Lymphadenopathy:     He has no cervical adenopathy.   Neurological: He is alert and oriented to person, place, and time.   Moves all 4 extremities fine.   Skin: No rash noted.   Psychiatric: He has a normal mood and affect. His behavior is normal. Thought content normal.   Vitals reviewed.      Assessment:       1. Tubular adenoma of colon    2. Colon cancer screening    3. Skin lesion of chest wall    4. Prostate cancer screening    5. Seasonal allergies    6. Pre-hypertension    7. Anxiety    8. Other hyperlipidemia    9. Dyslipidemia    10. Family history of heart disease    11. Impaired fasting blood sugar    12. Class 2 severe " obesity due to excess calories with serious comorbidity and body mass index (BMI) of 36.0 to 36.9 in adult        Plan:       Tubular adenoma of colon on  path from TC 6/17/19  -     CBC auto differential; Future; Expected date: 01/15/2020    Colon cancer screening: TC due for repeat 6/17/2024 w Dr Abbasi. ASA 81 mg a day. High fiber diet.   -     CBC auto differential; Future; Expected date: 01/15/2020    Skin lesion of chest wall; to f/u w Dr ALF Marie for stitch removal in 10 days and follow up path report; f/u 6 mos apt 7/13/2020 as well. .   -     Ambulatory referral to Dermatology Dr ALF Marie    Prostate cancer screening  -     PSA, Screening; Future; Expected date: 01/15/2020    Seasonal allergies; zyrtec 10 mg a day for congestion as needed. Flonase for congestion as well as needed   -     CBC auto differential; Future; Expected date: 01/15/2020    Pre-hypertension.Maintain < 2 Gm Na a day diet, and monitor BP ; keep a log.  -     TSH; Future; Expected date: 01/15/2020    Anxiety.Limit caffeine intake with stress reduction and regular exercise as tolerated.    Other hyperlipidemia.Maintain low fat high fiber diet, exercise regularly. Weight reduction where indicated.  -     Comprehensive metabolic panel; Future; Expected date: 01/15/2020  -     Lipid panel; Future; Expected date: 01/15/2020  -     TSH; Future; Expected date: 01/15/2020    Dyslipidemia; as above.   -     Lipid panel; Future; Expected date: 01/15/2020    Family history of heart disease    Impaired fasting blood sugar.Exercise recommended with weight reduction and low carb diet; we'll follow hemoglobin A1c's with you periodically.  -     Comprehensive metabolic panel; Future; Expected date: 01/15/2020  -     Hemoglobin A1c; Future; Expected date: 01/15/2020  -     Urinalysis; Future; Expected date: 01/15/2020  -     Cancel: Hemoglobin A1c; Future; Expected date: 01/15/2020  -     TSH; Future; Expected date: 01/15/2020    Class 2 severe obesity due  to excess calories with serious comorbidity and body mass index (BMI) of 36.0 to 36.9 in adult.Caloric restriction w regular exercise and weight reduction.  -     Comprehensive metabolic panel; Future; Expected date: 01/15/2020  -     Hemoglobin A1c; Future; Expected date: 01/15/2020  -     Cancel: Hemoglobin A1c; Future; Expected date: 01/15/2020  -     TSH; Future; Expected date: 01/15/2020

## 2020-01-15 NOTE — PATIENT INSTRUCTIONS
Tubular adenoma of colon on  path from TC 6/17/19  -     CBC auto differential; Future; Expected date: 01/15/2020    Colon cancer screening: TC due for repeat 6/17/2024 w Dr Abbasi. ASA 81 mg a day. High fiber diet.   -     CBC auto differential; Future; Expected date: 01/15/2020    Skin lesion of chest wall; to f/u w Dr ALF Marie for stitch removal in 10 days and follow up path report; f/u 6 mos apt 7/13/2020 as well. .   -     Ambulatory referral to Dermatology Dr ALF Marie    Prostate cancer screening  -     PSA, Screening; Future; Expected date: 01/15/2020    Seasonal allergies; zyrtec 10 mg a day for congestion as needed. Flonase for congestion as well as needed   -     CBC auto differential; Future; Expected date: 01/15/2020    Pre-hypertension.Maintain < 2 Gm Na a day diet, and monitor BP ; keep a log.  -     TSH; Future; Expected date: 01/15/2020    Anxiety.Limit caffeine intake with stress reduction and regular exercise as tolerated.    Other hyperlipidemia.Maintain low fat high fiber diet, exercise regularly. Weight reduction where indicated.  -     Comprehensive metabolic panel; Future; Expected date: 01/15/2020  -     Lipid panel; Future; Expected date: 01/15/2020  -     TSH; Future; Expected date: 01/15/2020    Dyslipidemia; as above.   -     Lipid panel; Future; Expected date: 01/15/2020    Family history of heart disease    Impaired fasting blood sugar.Exercise recommended with weight reduction and low carb diet; we'll follow hemoglobin A1c's with you periodically.  -     Comprehensive metabolic panel; Future; Expected date: 01/15/2020  -     Hemoglobin A1c; Future; Expected date: 01/15/2020  -     Urinalysis; Future; Expected date: 01/15/2020  -     Cancel: Hemoglobin A1c; Future; Expected date: 01/15/2020  -     TSH; Future; Expected date: 01/15/2020    Class 2 severe obesity due to excess calories with serious comorbidity and body mass index (BMI) of 36.0 to 36.9 in adult.Caloric restriction w regular  exercise and weight reduction.  -     Comprehensive metabolic panel; Future; Expected date: 01/15/2020  -     Hemoglobin A1c; Future; Expected date: 01/15/2020  -     Cancel: Hemoglobin A1c; Future; Expected date: 01/15/2020  -     TSH; Future; Expected date: 01/15/2020

## 2020-02-19 ENCOUNTER — LAB VISIT (OUTPATIENT)
Dept: LAB | Facility: HOSPITAL | Age: 50
End: 2020-02-19
Attending: INTERNAL MEDICINE
Payer: OTHER GOVERNMENT

## 2020-02-19 DIAGNOSIS — E66.01 CLASS 2 SEVERE OBESITY DUE TO EXCESS CALORIES WITH SERIOUS COMORBIDITY AND BODY MASS INDEX (BMI) OF 36.0 TO 36.9 IN ADULT: ICD-10-CM

## 2020-02-19 DIAGNOSIS — Z12.5 PROSTATE CANCER SCREENING: ICD-10-CM

## 2020-02-19 DIAGNOSIS — R03.0 PRE-HYPERTENSION: ICD-10-CM

## 2020-02-19 DIAGNOSIS — D12.6 TUBULAR ADENOMA OF COLON: ICD-10-CM

## 2020-02-19 DIAGNOSIS — E78.5 DYSLIPIDEMIA: ICD-10-CM

## 2020-02-19 DIAGNOSIS — E78.49 OTHER HYPERLIPIDEMIA: ICD-10-CM

## 2020-02-19 DIAGNOSIS — Z12.11 COLON CANCER SCREENING: ICD-10-CM

## 2020-02-19 DIAGNOSIS — J30.2 SEASONAL ALLERGIES: ICD-10-CM

## 2020-02-19 DIAGNOSIS — R73.01 IMPAIRED FASTING BLOOD SUGAR: ICD-10-CM

## 2020-02-19 LAB
ALBUMIN SERPL BCP-MCNC: 4.1 G/DL (ref 3.5–5.2)
ALP SERPL-CCNC: 58 U/L (ref 55–135)
ALT SERPL W/O P-5'-P-CCNC: 46 U/L (ref 10–44)
ANION GAP SERPL CALC-SCNC: 9 MMOL/L (ref 8–16)
AST SERPL-CCNC: 28 U/L (ref 10–40)
BASOPHILS # BLD AUTO: 0.03 K/UL (ref 0–0.2)
BASOPHILS NFR BLD: 0.5 % (ref 0–1.9)
BILIRUB SERPL-MCNC: 0.6 MG/DL (ref 0.1–1)
BUN SERPL-MCNC: 13 MG/DL (ref 6–20)
CALCIUM SERPL-MCNC: 9 MG/DL (ref 8.7–10.5)
CHLORIDE SERPL-SCNC: 106 MMOL/L (ref 95–110)
CHOLEST SERPL-MCNC: 179 MG/DL (ref 120–199)
CHOLEST/HDLC SERPL: 4.2 {RATIO} (ref 2–5)
CO2 SERPL-SCNC: 25 MMOL/L (ref 23–29)
COMPLEXED PSA SERPL-MCNC: 0.54 NG/ML (ref 0–4)
CREAT SERPL-MCNC: 1.1 MG/DL (ref 0.5–1.4)
DIFFERENTIAL METHOD: ABNORMAL
EOSINOPHIL # BLD AUTO: 0.1 K/UL (ref 0–0.5)
EOSINOPHIL NFR BLD: 1.8 % (ref 0–8)
ERYTHROCYTE [DISTWIDTH] IN BLOOD BY AUTOMATED COUNT: 12.1 % (ref 11.5–14.5)
EST. GFR  (AFRICAN AMERICAN): >60 ML/MIN/1.73 M^2
EST. GFR  (NON AFRICAN AMERICAN): >60 ML/MIN/1.73 M^2
ESTIMATED AVG GLUCOSE: 114 MG/DL (ref 68–131)
GLUCOSE SERPL-MCNC: 101 MG/DL (ref 70–110)
HBA1C MFR BLD HPLC: 5.6 % (ref 4–5.6)
HCT VFR BLD AUTO: 51.4 % (ref 40–54)
HDLC SERPL-MCNC: 43 MG/DL (ref 40–75)
HDLC SERPL: 24 % (ref 20–50)
HGB BLD-MCNC: 16.3 G/DL (ref 14–18)
IMM GRANULOCYTES # BLD AUTO: 0.02 K/UL (ref 0–0.04)
IMM GRANULOCYTES NFR BLD AUTO: 0.4 % (ref 0–0.5)
LDLC SERPL CALC-MCNC: 110 MG/DL (ref 63–159)
LYMPHOCYTES # BLD AUTO: 2 K/UL (ref 1–4.8)
LYMPHOCYTES NFR BLD: 35.7 % (ref 18–48)
MCH RBC QN AUTO: 30.3 PG (ref 27–31)
MCHC RBC AUTO-ENTMCNC: 31.7 G/DL (ref 32–36)
MCV RBC AUTO: 96 FL (ref 82–98)
MONOCYTES # BLD AUTO: 0.4 K/UL (ref 0.3–1)
MONOCYTES NFR BLD: 7.1 % (ref 4–15)
NEUTROPHILS # BLD AUTO: 3 K/UL (ref 1.8–7.7)
NEUTROPHILS NFR BLD: 54.5 % (ref 38–73)
NONHDLC SERPL-MCNC: 136 MG/DL
NRBC BLD-RTO: 0 /100 WBC
PLATELET # BLD AUTO: 195 K/UL (ref 150–350)
PMV BLD AUTO: 11.4 FL (ref 9.2–12.9)
POTASSIUM SERPL-SCNC: 4.8 MMOL/L (ref 3.5–5.1)
PROT SERPL-MCNC: 6.8 G/DL (ref 6–8.4)
RBC # BLD AUTO: 5.38 M/UL (ref 4.6–6.2)
SODIUM SERPL-SCNC: 140 MMOL/L (ref 136–145)
TRIGL SERPL-MCNC: 130 MG/DL (ref 30–150)
TSH SERPL DL<=0.005 MIU/L-ACNC: 1.26 UIU/ML (ref 0.4–4)
WBC # BLD AUTO: 5.49 K/UL (ref 3.9–12.7)

## 2020-02-19 PROCEDURE — 85025 COMPLETE CBC W/AUTO DIFF WBC: CPT

## 2020-02-19 PROCEDURE — 84153 ASSAY OF PSA TOTAL: CPT

## 2020-02-19 PROCEDURE — 80061 LIPID PANEL: CPT

## 2020-02-19 PROCEDURE — 83036 HEMOGLOBIN GLYCOSYLATED A1C: CPT

## 2020-02-19 PROCEDURE — 36415 COLL VENOUS BLD VENIPUNCTURE: CPT | Mod: PN

## 2020-02-19 PROCEDURE — 80053 COMPREHEN METABOLIC PANEL: CPT

## 2020-02-19 PROCEDURE — 84443 ASSAY THYROID STIM HORMONE: CPT

## 2020-04-08 ENCOUNTER — TELEPHONE (OUTPATIENT)
Dept: FAMILY MEDICINE | Facility: CLINIC | Age: 50
End: 2020-04-08

## 2020-04-08 ENCOUNTER — PATIENT MESSAGE (OUTPATIENT)
Dept: FAMILY MEDICINE | Facility: CLINIC | Age: 50
End: 2020-04-08

## 2020-04-15 ENCOUNTER — OFFICE VISIT (OUTPATIENT)
Dept: FAMILY MEDICINE | Facility: CLINIC | Age: 50
End: 2020-04-15
Payer: OTHER GOVERNMENT

## 2020-04-15 ENCOUNTER — TELEPHONE (OUTPATIENT)
Dept: FAMILY MEDICINE | Facility: CLINIC | Age: 50
End: 2020-04-15

## 2020-04-15 DIAGNOSIS — E78.5 DYSLIPIDEMIA: ICD-10-CM

## 2020-04-15 DIAGNOSIS — F41.9 ANXIETY: ICD-10-CM

## 2020-04-15 DIAGNOSIS — Z12.5 PROSTATE CANCER SCREENING: ICD-10-CM

## 2020-04-15 DIAGNOSIS — Z80.42 FAMILY HISTORY OF PROSTATE CANCER IN FATHER: ICD-10-CM

## 2020-04-15 DIAGNOSIS — E66.01 CLASS 2 SEVERE OBESITY DUE TO EXCESS CALORIES WITH SERIOUS COMORBIDITY AND BODY MASS INDEX (BMI) OF 36.0 TO 36.9 IN ADULT: ICD-10-CM

## 2020-04-15 DIAGNOSIS — Z82.49 FAMILY HISTORY OF HEART DISEASE IN MALE FAMILY MEMBER BEFORE AGE 55: ICD-10-CM

## 2020-04-15 DIAGNOSIS — R74.01 TRANSAMINITIS: ICD-10-CM

## 2020-04-15 DIAGNOSIS — Z01.89 ENCOUNTER FOR LABORATORY TEST: ICD-10-CM

## 2020-04-15 DIAGNOSIS — R73.01 IMPAIRED FASTING BLOOD SUGAR: ICD-10-CM

## 2020-04-15 DIAGNOSIS — E78.49 OTHER HYPERLIPIDEMIA: Primary | ICD-10-CM

## 2020-04-15 PROCEDURE — 99214 OFFICE O/P EST MOD 30 MIN: CPT | Mod: 95,,, | Performed by: INTERNAL MEDICINE

## 2020-04-15 PROCEDURE — 99214 PR OFFICE/OUTPT VISIT, EST, LEVL IV, 30-39 MIN: ICD-10-PCS | Mod: 95,,, | Performed by: INTERNAL MEDICINE

## 2020-04-15 NOTE — PROGRESS NOTES
Subjective:      The patient location is: home  The chief complaint leading to consultation is:  Reassessment and go over his labs.   Visit type: audiovisual  Total time spent with patient:  3:02 p.m. Till 3:27 p.m. greater than 50% of the time spent in discussion, counseling, and review.  Each patient to whom he or she provides medical services by telemedicine is:  (1) informed of the relationship between the physician and patient and the respective role of any other health care provider with respect to management of the patient; and (2) notified that he or she may decline to receive medical services by telemedicine and may withdraw from such care at any time.    Notes:  Please see below     Patient ID: Sam Morse is a 49 y.o. male.    Chief Complaint:  Here for reassessment and go over his labs    HPI  Patient here today for reassessment and review and discussion of his labs.  Labs reviewed with patient at length and ordered for follow-up.     Anxiety:  Works at home in the computer; BuSpar has been used a couple of times for anxiety and helps.  He has no depression.  He exercises regularly and limits his caffeine intake.     Other hyperlipidemia/dyslipidemia:  On low-fat high-fiber diet or at least tries keeps active physically; total cholesterol 179/triglyceride 130/HDL 43 (up slightly as previously 37);  (129 prior).  Recommended tightening diet and exercise regimen to help lose weight as well.     Family history of heart disease involving his dad who had a coronary stent placed at age 49..     Impaired fasting blood sugar:  Limit his carbohydrate or least tries; recent fasting blood sugar 101 with hemoglobin A1c 5.6.     Class 2 obesity:  Weight has been about the same to slightly reduced.  On 01/15/2020 BMI 36.67 with weight 241 lb and 3 oz     Transaminitis:  Very minimum elevation of ALT to 46 previously was normal at 44.  Alcohol is rare only 2 drinks per week.  Members the time of last blood  work patient was at [employer] and may not have been hydrated enough.  Wishes to hold on any workup for now.  May also have a component of early fatty liver.  Stressed importance of keeping well hydrated with working out and physical activity as well as limiting Motrin use.     Prostate cancer screenin2020 PSA normal at 0.54; family history of prostate cancer involving his dad..    Review of Systems   Constitutional: Negative for fever and unexpected weight change.   HENT: Negative for congestion, postnasal drip and rhinorrhea.    Respiratory: Negative for cough, chest tightness, shortness of breath and wheezing.    Cardiovascular: Negative for chest pain, palpitations and leg swelling.   Gastrointestinal: Negative for abdominal pain, blood in stool, constipation, diarrhea, nausea and vomiting.   Genitourinary: Negative for dysuria and hematuria.   Musculoskeletal: Negative for arthralgias and myalgias.   Psychiatric/Behavioral: Negative for dysphoric mood. The patient is not nervous/anxious.        Objective:      There were no vitals filed for this visit.  There is no height or weight on file to calculate BMI.  Wt Readings from Last 3 Encounters:   01/15/20 109.4 kg (241 lb 2.9 oz)   19 102.1 kg (225 lb)   19 109.4 kg (241 lb 1.2 oz)        Physical Exam   Constitutional: He is oriented to person, place, and time. He appears well-developed and well-nourished. No distress.   No apparent distress; answers questions appropriately with good thought content   HENT:   Head: Normocephalic and atraumatic.   Eyes: EOM are normal.   Pulmonary/Chest: Effort normal. No respiratory distress.   Breathing comfortably with no signs of respiratory distress   Musculoskeletal: Normal range of motion.   Neurological: He is alert and oriented to person, place, and time.   Skin: He is not diaphoretic.   Psychiatric: He has a normal mood and affect. His behavior is normal.       Assessment:       1. Other  hyperlipidemia    2. Dyslipidemia    3. Family history of heart disease in male family member before age 55    4. Impaired fasting blood sugar    5. Anxiety    6. Class 2 severe obesity due to excess calories with serious comorbidity and body mass index (BMI) of 36.0 to 36.9 in adult    7. Transaminitis    8. Prostate cancer screening    9. Family history of prostate cancer in father    10. Encounter for laboratory test        Plan:       Other hyperlipidemia: Maintain low fat high fiber diet, exercise regularly. Weight reduction where indicated.  -     Comprehensive metabolic panel; Future; Expected date: 04/15/2020  -     Lipid panel; Future; Expected date: 04/15/2020    Dyslipidemia: Maintain low fat high fiber diet, exercise regularly. Weight reduction where indicated.-     Comprehensive metabolic panel; Future; Expected date: 04/15/2020  -     Lipid panel; Future; Expected date: 04/15/2020    Family history of heart disease in male family member before age 55    Impaired fasting blood sugar: Exercise recommended with weight reduction and low carb diet; we'll follow hemoglobin A1c's with you periodically.  -     Comprehensive metabolic panel; Future; Expected date: 04/15/2020  -     Hemoglobin A1c; Future; Expected date: 04/15/2020  -     Urinalysis; Future; Expected date: 04/15/2020    Anxiety: Limit caffeine intake with stress reduction and regular exercise as tolerated.    Class 2 severe obesity due to excess calories with serious comorbidity and body mass index (BMI) of 36.0 to 36.9 in adult: Caloric restriction w regular exercise and weight reduction.  -     Comprehensive metabolic panel; Future; Expected date: 04/15/2020  -     Hemoglobin A1c; Future; Expected date: 04/15/2020    Transaminitis:  Keep well hydrated especially when exerting physical activity outside; limit NSA ID use like Aleve/ibuprofen/Motrin, and Tylenol use.  Regular exercise and weight reduction will also help  -     Comprehensive  metabolic panel; Future; Expected date: 04/15/2020    Prostate cancer screening:  PSA due after 02/19/2021.  02/19/2020 PSA normal at 0.54.    Family history of prostate cancer in father    Encounter for laboratory test  -     Comprehensive metabolic panel; Future; Expected date: 04/15/2020  -     Lipid panel; Future; Expected date: 04/15/2020  -     Hemoglobin A1c; Future; Expected date: 04/15/2020  -     Urinalysis; Future; Expected date: 04/15/2020

## 2020-04-15 NOTE — PATIENT INSTRUCTIONS
Other hyperlipidemia: Maintain low fat high fiber diet, exercise regularly. Weight reduction where indicated.  -     Comprehensive metabolic panel; Future; Expected date: 04/15/2020  -     Lipid panel; Future; Expected date: 04/15/2020    Dyslipidemia: Maintain low fat high fiber diet, exercise regularly. Weight reduction where indicated.-     Comprehensive metabolic panel; Future; Expected date: 04/15/2020  -     Lipid panel; Future; Expected date: 04/15/2020    Family history of heart disease in male family member before age 55    Impaired fasting blood sugar: Exercise recommended with weight reduction and low carb diet; we'll follow hemoglobin A1c's with you periodically.  -     Comprehensive metabolic panel; Future; Expected date: 04/15/2020  -     Hemoglobin A1c; Future; Expected date: 04/15/2020  -     Urinalysis; Future; Expected date: 04/15/2020    Anxiety: Limit caffeine intake with stress reduction and regular exercise as tolerated.    Class 2 severe obesity due to excess calories with serious comorbidity and body mass index (BMI) of 36.0 to 36.9 in adult: Caloric restriction w regular exercise and weight reduction.  -     Comprehensive metabolic panel; Future; Expected date: 04/15/2020  -     Hemoglobin A1c; Future; Expected date: 04/15/2020    Transaminitis:  Keep well hydrated especially when exerting physical activity outside; limit NSA ID use like Aleve/ibuprofen/Motrin, and Tylenol use.  Regular exercise and weight reduction will also help  -     Comprehensive metabolic panel; Future; Expected date: 04/15/2020    Prostate cancer screening:  PSA due after 02/19/2021.  02/19/2020 PSA normal at 0.54.    Family history of prostate cancer in father    Encounter for laboratory test  -     Comprehensive metabolic panel; Future; Expected date: 04/15/2020  -     Lipid panel; Future; Expected date: 04/15/2020  -     Hemoglobin A1c; Future; Expected date: 04/15/2020  -     Urinalysis; Future; Expected date:  04/15/2020

## 2020-04-15 NOTE — TELEPHONE ENCOUNTER
Patient's virtual visit for today is been completed.  Please notify patient that his after visit summary is available in his portal for his review.  Please schedule follow-up appointment in 4 months with us here in the office.  Please have him obtain his labs after an overnight fast 1 week before his follow-up

## 2020-07-31 ENCOUNTER — TELEPHONE (OUTPATIENT)
Dept: FAMILY MEDICINE | Facility: CLINIC | Age: 50
End: 2020-07-31

## 2020-07-31 ENCOUNTER — LAB VISIT (OUTPATIENT)
Dept: PRIMARY CARE CLINIC | Facility: OTHER | Age: 50
End: 2020-07-31
Attending: INTERNAL MEDICINE
Payer: OTHER GOVERNMENT

## 2020-07-31 DIAGNOSIS — Z03.818 ENCOUNTER FOR OBSERVATION FOR SUSPECTED EXPOSURE TO OTHER BIOLOGICAL AGENTS RULED OUT: ICD-10-CM

## 2020-07-31 PROCEDURE — U0003 INFECTIOUS AGENT DETECTION BY NUCLEIC ACID (DNA OR RNA); SEVERE ACUTE RESPIRATORY SYNDROME CORONAVIRUS 2 (SARS-COV-2) (CORONAVIRUS DISEASE [COVID-19]), AMPLIFIED PROBE TECHNIQUE, MAKING USE OF HIGH THROUGHPUT TECHNOLOGIES AS DESCRIBED BY CMS-2020-01-R: HCPCS

## 2020-07-31 NOTE — TELEPHONE ENCOUNTER
----- Message from Janet Ortega sent at 7/31/2020  8:16 AM CDT -----  Type: Needs Medical Advice  Who Called:  Patient   Best Call Back Number:   Additional Information: Per patient asymptomatic, requesting a COVID test-please advise-thank you

## 2020-07-31 NOTE — TELEPHONE ENCOUNTER
Spoke with pt, he states his kids were possibly exposed to someone who is COVID positive and he had them tested and is requesting a test for himself. He is asymptomatic, referred him to community site.

## 2020-08-04 LAB — SARS-COV-2 RNA RESP QL NAA+PROBE: NORMAL

## 2020-09-02 ENCOUNTER — TELEPHONE (OUTPATIENT)
Dept: FAMILY MEDICINE | Facility: CLINIC | Age: 50
End: 2020-09-02

## 2020-09-02 NOTE — TELEPHONE ENCOUNTER
Called to reschedule appt that was canceled due to weather, lab appointment and appt with provider made.

## 2020-09-04 ENCOUNTER — LAB VISIT (OUTPATIENT)
Dept: LAB | Facility: HOSPITAL | Age: 50
End: 2020-09-04
Attending: INTERNAL MEDICINE
Payer: OTHER GOVERNMENT

## 2020-09-04 DIAGNOSIS — E66.01 CLASS 2 SEVERE OBESITY DUE TO EXCESS CALORIES WITH SERIOUS COMORBIDITY AND BODY MASS INDEX (BMI) OF 36.0 TO 36.9 IN ADULT: ICD-10-CM

## 2020-09-04 DIAGNOSIS — R74.01 TRANSAMINITIS: ICD-10-CM

## 2020-09-04 DIAGNOSIS — R73.01 IMPAIRED FASTING BLOOD SUGAR: ICD-10-CM

## 2020-09-04 DIAGNOSIS — E78.5 DYSLIPIDEMIA: ICD-10-CM

## 2020-09-04 DIAGNOSIS — Z01.89 ENCOUNTER FOR LABORATORY TEST: ICD-10-CM

## 2020-09-04 DIAGNOSIS — E78.49 OTHER HYPERLIPIDEMIA: ICD-10-CM

## 2020-09-04 LAB
ALBUMIN SERPL BCP-MCNC: 4.1 G/DL (ref 3.5–5.2)
ALP SERPL-CCNC: 56 U/L (ref 55–135)
ALT SERPL W/O P-5'-P-CCNC: 32 U/L (ref 10–44)
ANION GAP SERPL CALC-SCNC: 6 MMOL/L (ref 8–16)
AST SERPL-CCNC: 22 U/L (ref 10–40)
BILIRUB SERPL-MCNC: 0.3 MG/DL (ref 0.1–1)
BUN SERPL-MCNC: 12 MG/DL (ref 6–20)
CALCIUM SERPL-MCNC: 8.5 MG/DL (ref 8.7–10.5)
CHLORIDE SERPL-SCNC: 107 MMOL/L (ref 95–110)
CHOLEST SERPL-MCNC: 167 MG/DL (ref 120–199)
CHOLEST/HDLC SERPL: 4.6 {RATIO} (ref 2–5)
CO2 SERPL-SCNC: 25 MMOL/L (ref 23–29)
CREAT SERPL-MCNC: 1 MG/DL (ref 0.5–1.4)
EST. GFR  (AFRICAN AMERICAN): >60 ML/MIN/1.73 M^2
EST. GFR  (NON AFRICAN AMERICAN): >60 ML/MIN/1.73 M^2
GLUCOSE SERPL-MCNC: 102 MG/DL (ref 70–110)
HDLC SERPL-MCNC: 36 MG/DL (ref 40–75)
HDLC SERPL: 21.6 % (ref 20–50)
LDLC SERPL CALC-MCNC: 95.2 MG/DL (ref 63–159)
NONHDLC SERPL-MCNC: 131 MG/DL
POTASSIUM SERPL-SCNC: 4.1 MMOL/L (ref 3.5–5.1)
PROT SERPL-MCNC: 6.5 G/DL (ref 6–8.4)
SODIUM SERPL-SCNC: 138 MMOL/L (ref 136–145)
TRIGL SERPL-MCNC: 179 MG/DL (ref 30–150)

## 2020-09-04 PROCEDURE — 80061 LIPID PANEL: CPT

## 2020-09-04 PROCEDURE — 83036 HEMOGLOBIN GLYCOSYLATED A1C: CPT

## 2020-09-04 PROCEDURE — 80053 COMPREHEN METABOLIC PANEL: CPT

## 2020-09-04 PROCEDURE — 36415 COLL VENOUS BLD VENIPUNCTURE: CPT | Mod: PN

## 2020-09-05 LAB
ESTIMATED AVG GLUCOSE: 108 MG/DL (ref 68–131)
HBA1C MFR BLD HPLC: 5.4 % (ref 4–5.6)

## 2020-09-09 ENCOUNTER — OFFICE VISIT (OUTPATIENT)
Dept: FAMILY MEDICINE | Facility: CLINIC | Age: 50
End: 2020-09-09
Payer: OTHER GOVERNMENT

## 2020-09-09 VITALS
WEIGHT: 237 LBS | BODY MASS INDEX: 35.92 KG/M2 | DIASTOLIC BLOOD PRESSURE: 82 MMHG | HEART RATE: 60 BPM | TEMPERATURE: 98 F | SYSTOLIC BLOOD PRESSURE: 136 MMHG | RESPIRATION RATE: 16 BRPM | HEIGHT: 68 IN

## 2020-09-09 DIAGNOSIS — E78.5 DYSLIPIDEMIA: ICD-10-CM

## 2020-09-09 DIAGNOSIS — R03.0 ELEVATED BP WITHOUT DIAGNOSIS OF HYPERTENSION: ICD-10-CM

## 2020-09-09 DIAGNOSIS — F41.9 ANXIETY: ICD-10-CM

## 2020-09-09 DIAGNOSIS — R73.01 IMPAIRED FASTING GLUCOSE: ICD-10-CM

## 2020-09-09 DIAGNOSIS — E66.01 CLASS 2 SEVERE OBESITY DUE TO EXCESS CALORIES WITH SERIOUS COMORBIDITY AND BODY MASS INDEX (BMI) OF 36.0 TO 36.9 IN ADULT: ICD-10-CM

## 2020-09-09 DIAGNOSIS — E83.51 HYPOCALCEMIA: ICD-10-CM

## 2020-09-09 DIAGNOSIS — E78.2 MIXED HYPERLIPIDEMIA: Primary | ICD-10-CM

## 2020-09-09 PROCEDURE — 99999 PR PBB SHADOW E&M-EST. PATIENT-LVL III: ICD-10-PCS | Mod: PBBFAC,,, | Performed by: INTERNAL MEDICINE

## 2020-09-09 PROCEDURE — 99999 PR PBB SHADOW E&M-EST. PATIENT-LVL III: CPT | Mod: PBBFAC,,, | Performed by: INTERNAL MEDICINE

## 2020-09-09 PROCEDURE — 99214 PR OFFICE/OUTPT VISIT, EST, LEVL IV, 30-39 MIN: ICD-10-PCS | Mod: S$PBB,,, | Performed by: INTERNAL MEDICINE

## 2020-09-09 PROCEDURE — 99213 OFFICE O/P EST LOW 20 MIN: CPT | Mod: PBBFAC,PN | Performed by: INTERNAL MEDICINE

## 2020-09-09 PROCEDURE — 90471 IMMUNIZATION ADMIN: CPT | Mod: PBBFAC,PN

## 2020-09-09 PROCEDURE — 99214 OFFICE O/P EST MOD 30 MIN: CPT | Mod: S$PBB,,, | Performed by: INTERNAL MEDICINE

## 2020-09-09 RX ORDER — BUSPIRONE HYDROCHLORIDE 7.5 MG/1
TABLET ORAL
Qty: 30 TABLET | Refills: 2 | Status: SHIPPED | OUTPATIENT
Start: 2020-09-09 | End: 2023-07-21

## 2020-09-09 NOTE — PROGRESS NOTES
Subjective:       Patient ID: Sam Morse is a 49 y.o. male.    Chief Complaint: No chief complaint on file.    HPI  here today for reassessment and go over labs.     Encounter for laboratory testing; labs reviewed with patient at length; plan of care discussed; ordered for follow-up.     Mixed hyperlipidemia/dyslipidemia:  On low-fat high-fiber diet.  Lipid profile:  Cholesterol 167 triglyceride 179 HDL 36 LDL 95.  Regular exercise and weight reduction will also help.  Ten year risk of ASCVD is low at 3.4%     Obesity:  BMI 36.03; can benefit from regular exercise as well as small portions to help his weight come down.  Has dropped 4 lb since 01/15/2020.     Elevated blood pressure without a diagnosis of hypertension:  Blood pressure here 136/82.  Need to monitor his blood pressure at home rule out white coat syndrome with come into the office.  Medical facility blood pressure checks 6/2018:131/80, 133/78.  03/13/2019: 130/74; 01/15/20: 134/86.  Advised to proper technique of home blood pressure monitoring and keep a log for office review.     Impaired fasting blood sugar:  Hemoglobin A1c 5.4 fasting blood sugars have been 102 most recent, 101 in and 102 in front of that.     Anxiety has been stable considering recent COVID events.  Has BuSpar to use as needed for anxiety requesting refill.     Hypocalcemia:  8.5 calcium level on recent chemistry; prior was 9.0 and 9.2.  Will repeat calcium level with a vitamin-D level     Total time:  9:56 a.m. to 10:26 a.m..  Greater than 50% of time spent in discussion, labs reviewed and ordered for follow-up.  Medications also reviewed and addressed.  Additional 15 min spent on supplemental documentation and review    The 10-year ASCVD risk score (Bryanna HERNÁNDEZ Jr., et al., 2013) is: 3.4%    Values used to calculate the score:      Age: 49 years      Sex: Male      Is Non- : No      Diabetic: No      Tobacco smoker: No      Systolic Blood Pressure: 136  "mmHg      Is BP treated: No      HDL Cholesterol: 36 mg/dL      Total Cholesterol: 167 mg/dL      Review of Systems   Constitutional: Negative for appetite change and unexpected weight change.   HENT: Negative for congestion, postnasal drip, rhinorrhea and sinus pressure.         Denies seasonal allergies, or perennial allergies   Eyes: Negative for discharge and itching.   Respiratory: Negative for cough, chest tightness, shortness of breath and wheezing.    Cardiovascular: Negative for chest pain, palpitations and leg swelling.   Gastrointestinal: Negative for abdominal pain, blood in stool, constipation, diarrhea, nausea and vomiting.   Endocrine: Negative for polydipsia, polyphagia and polyuria.   Genitourinary: Negative for dysuria and hematuria.   Musculoskeletal: Negative for arthralgias and myalgias.   Skin: Negative for rash.   Allergic/Immunologic: Negative for environmental allergies and food allergies.   Neurological: Negative for tremors, seizures and headaches.   Hematological: Negative for adenopathy. Does not bruise/bleed easily.   Psychiatric/Behavioral:        Denies anxiety or depression.       Objective:      Vitals:    09/09/20 0950   BP: 136/82   BP Location: Left arm   Patient Position: Sitting   Pulse: 60   Resp: 16   Temp: 97.9 °F (36.6 °C)   TempSrc: Skin   Weight: 107.5 kg (236 lb 15.9 oz)   Height: 5' 8" (1.727 m)     Body mass index is 36.03 kg/m².  Wt Readings from Last 3 Encounters:   09/09/20 107.5 kg (236 lb 15.9 oz)   01/15/20 109.4 kg (241 lb 2.9 oz)   06/17/19 102.1 kg (225 lb)        Physical Exam  Vitals signs reviewed.   Constitutional:       Appearance: He is well-developed.   HENT:      Head: Normocephalic and atraumatic.   Neck:      Musculoskeletal: Normal range of motion and neck supple.      Thyroid: No thyromegaly.      Vascular: No carotid bruit.   Cardiovascular:      Rate and Rhythm: Normal rate and regular rhythm.      Heart sounds: Normal heart sounds. No murmur. No " gallop.    Pulmonary:      Effort: Pulmonary effort is normal. No respiratory distress.      Breath sounds: Normal breath sounds. No wheezing or rales.   Abdominal:      General: Bowel sounds are normal. There is no distension.      Palpations: Abdomen is soft.      Tenderness: There is no abdominal tenderness. There is no guarding or rebound.   Musculoskeletal: Normal range of motion.      Right lower leg: No edema.      Left lower leg: No edema.   Lymphadenopathy:      Cervical: No cervical adenopathy.   Skin:     Findings: No rash.   Neurological:      Mental Status: He is alert and oriented to person, place, and time.      Comments: Moves all 4 extremities fine.   Psychiatric:         Behavior: Behavior normal.         Thought Content: Thought content normal.         Assessment:       1. Mixed hyperlipidemia    2. Dyslipidemia    3. Anxiety    4. Impaired fasting glucose    5. Elevated BP without diagnosis of hypertension    6. Class 2 severe obesity due to excess calories with serious comorbidity and body mass index (BMI) of 36.0 to 36.9 in adult    7. Hypocalcemia        Plan:       Mixed hyperlipidemia: Maintain low fat high fiber diet, exercise regularly. Weight reduction where indicated.  -     Lipid Panel; Future; Expected date: 09/09/2020    Dyslipidemia: as above; exercise more during week.     Anxiety: buspar 7.5 mg 2x a day as needed for anxiety. Limit caffeine; exercise regularly.     Impaired fasting glucose: Exercise recommended with weight reduction and low carb diet; we'll follow hemoglobin A1c's with you periodically.  -     Glucose, fasting; Future; Expected date: 09/23/2020    Elevated BP without diagnosis of hypertension: Maintain < 2 Gm Na a day diet, and monitor BP at home; keep a log. Limit caffeine.     Class 2 severe obesity due to excess calories with serious comorbidity and body mass index (BMI) of 36.0 to 36.9 in adult: Caloric restriction w regular exercise and weight  reduction.    Hypocalcemia: await repeat levels.   -     Calcium; Future; Expected date: 09/09/2020  -     Vitamin D; Future; Expected date: 09/09/2020  -     Calcium; Future; Expected date: 09/09/2020

## 2020-09-09 NOTE — PATIENT INSTRUCTIONS
Mixed hyperlipidemia: Maintain low fat high fiber diet, exercise regularly. Weight reduction where indicated.  -     Lipid Panel; Future; Expected date: 09/09/2020    Dyslipidemia: as above; exercise more during week.     Anxiety: buspar 7.5 mg 2x a day as needed for anxiety. Limit caffeine; exercise regularly.     Impaired fasting glucose: Exercise recommended with weight reduction and low carb diet; we'll follow hemoglobin A1c's with you periodically.  -     Glucose, fasting; Future; Expected date: 09/23/2020    Elevated BP without diagnosis of hypertension: Maintain < 2 Gm Na a day diet, and monitor BP at home; keep a log. Limit caffeine.     Class 2 severe obesity due to excess calories with serious comorbidity and body mass index (BMI) of 36.0 to 36.9 in adult: Caloric restriction w regular exercise and weight reduction.    Hypocalcemia: await repeat levels.   -     Calcium; Future; Expected date: 09/09/2020  -     Vitamin D; Future; Expected date: 09/09/2020  -     Calcium; Future; Expected date: 09/09/2020

## 2020-09-10 ENCOUNTER — LAB VISIT (OUTPATIENT)
Dept: LAB | Facility: HOSPITAL | Age: 50
End: 2020-09-10
Attending: INTERNAL MEDICINE
Payer: OTHER GOVERNMENT

## 2020-09-10 ENCOUNTER — TELEPHONE (OUTPATIENT)
Dept: FAMILY MEDICINE | Facility: CLINIC | Age: 50
End: 2020-09-10

## 2020-09-10 DIAGNOSIS — E83.51 HYPOCALCEMIA: ICD-10-CM

## 2020-09-10 PROCEDURE — 36415 COLL VENOUS BLD VENIPUNCTURE: CPT | Mod: PN

## 2020-09-10 PROCEDURE — 82306 VITAMIN D 25 HYDROXY: CPT

## 2020-09-10 PROCEDURE — 82310 ASSAY OF CALCIUM: CPT

## 2020-09-10 NOTE — TELEPHONE ENCOUNTER
Please call patient and ask him to please schedule his 3 month follow-up from his recent office visit with labs to be obtained 1 week prior after an overnight fast which can be scheduled.  Please also remind him to obtain a calcium and vitamin-D level sometime within the next couple of weeks for reassessment as discussed in the office.

## 2020-09-11 LAB
25(OH)D3+25(OH)D2 SERPL-MCNC: 25 NG/ML (ref 30–96)
CALCIUM SERPL-MCNC: 8.7 MG/DL (ref 8.7–10.5)

## 2020-11-18 ENCOUNTER — HOSPITAL ENCOUNTER (OUTPATIENT)
Dept: RADIOLOGY | Facility: HOSPITAL | Age: 50
Discharge: HOME | End: 2020-11-18
Attending: INTERNAL MEDICINE
Payer: OTHER GOVERNMENT

## 2020-12-04 ENCOUNTER — LAB VISIT (OUTPATIENT)
Dept: LAB | Facility: HOSPITAL | Age: 50
End: 2020-12-04
Attending: INTERNAL MEDICINE
Payer: OTHER GOVERNMENT

## 2020-12-04 DIAGNOSIS — E78.2 MIXED HYPERLIPIDEMIA: ICD-10-CM

## 2020-12-04 DIAGNOSIS — R73.01 IMPAIRED FASTING GLUCOSE: ICD-10-CM

## 2020-12-04 DIAGNOSIS — E83.51 HYPOCALCEMIA: ICD-10-CM

## 2020-12-04 LAB
CALCIUM SERPL-MCNC: 8.8 MG/DL (ref 8.7–10.5)
CHOLEST SERPL-MCNC: 171 MG/DL (ref 120–199)
CHOLEST/HDLC SERPL: 4.5 {RATIO} (ref 2–5)
GLUCOSE SERPL-MCNC: 98 MG/DL (ref 70–110)
HDLC SERPL-MCNC: 38 MG/DL (ref 40–75)
HDLC SERPL: 22.2 % (ref 20–50)
LDLC SERPL CALC-MCNC: 112.6 MG/DL (ref 63–159)
NONHDLC SERPL-MCNC: 133 MG/DL
TRIGL SERPL-MCNC: 102 MG/DL (ref 30–150)

## 2020-12-04 PROCEDURE — 82947 ASSAY GLUCOSE BLOOD QUANT: CPT

## 2020-12-04 PROCEDURE — 82310 ASSAY OF CALCIUM: CPT

## 2020-12-04 PROCEDURE — 80061 LIPID PANEL: CPT

## 2020-12-04 PROCEDURE — 36415 COLL VENOUS BLD VENIPUNCTURE: CPT | Mod: PN

## 2020-12-09 ENCOUNTER — TELEPHONE (OUTPATIENT)
Dept: FAMILY MEDICINE | Facility: CLINIC | Age: 50
End: 2020-12-09

## 2021-01-13 ENCOUNTER — CLINICAL SUPPORT (OUTPATIENT)
Dept: URGENT CARE | Facility: CLINIC | Age: 51
End: 2021-01-13
Payer: OTHER GOVERNMENT

## 2021-01-13 ENCOUNTER — PATIENT MESSAGE (OUTPATIENT)
Dept: FAMILY MEDICINE | Facility: CLINIC | Age: 51
End: 2021-01-13

## 2021-01-13 ENCOUNTER — TELEPHONE (OUTPATIENT)
Dept: FAMILY MEDICINE | Facility: CLINIC | Age: 51
End: 2021-01-13

## 2021-01-13 DIAGNOSIS — Z20.822 EXPOSURE TO COVID-19 VIRUS: Primary | ICD-10-CM

## 2021-01-13 LAB
CTP QC/QA: YES
SARS-COV-2 RDRP RESP QL NAA+PROBE: NEGATIVE

## 2021-01-13 PROCEDURE — U0002 COVID-19 LAB TEST NON-CDC: HCPCS | Mod: QW,S$GLB,, | Performed by: INTERNAL MEDICINE

## 2021-01-13 PROCEDURE — U0002: ICD-10-PCS | Mod: QW,S$GLB,, | Performed by: INTERNAL MEDICINE

## 2021-04-29 ENCOUNTER — PATIENT MESSAGE (OUTPATIENT)
Dept: RESEARCH | Facility: HOSPITAL | Age: 51
End: 2021-04-29

## 2021-08-04 DIAGNOSIS — Z11.52 ENCOUNTER FOR SCREENING LABORATORY TESTING FOR COVID-19 VIRUS: Primary | ICD-10-CM

## 2021-08-05 ENCOUNTER — LAB VISIT (OUTPATIENT)
Dept: FAMILY MEDICINE | Facility: CLINIC | Age: 51
End: 2021-08-05
Payer: OTHER GOVERNMENT

## 2021-08-05 DIAGNOSIS — Z11.52 ENCOUNTER FOR SCREENING LABORATORY TESTING FOR COVID-19 VIRUS: ICD-10-CM

## 2021-08-05 PROCEDURE — U0003 INFECTIOUS AGENT DETECTION BY NUCLEIC ACID (DNA OR RNA); SEVERE ACUTE RESPIRATORY SYNDROME CORONAVIRUS 2 (SARS-COV-2) (CORONAVIRUS DISEASE [COVID-19]), AMPLIFIED PROBE TECHNIQUE, MAKING USE OF HIGH THROUGHPUT TECHNOLOGIES AS DESCRIBED BY CMS-2020-01-R: HCPCS | Performed by: FAMILY MEDICINE

## 2021-08-05 PROCEDURE — U0005 INFEC AGEN DETEC AMPLI PROBE: HCPCS | Performed by: FAMILY MEDICINE

## 2021-08-06 LAB
SARS-COV-2 RNA RESP QL NAA+PROBE: NOT DETECTED
SARS-COV-2- CYCLE NUMBER: -1

## 2022-02-28 ENCOUNTER — PATIENT MESSAGE (OUTPATIENT)
Dept: ADMINISTRATIVE | Facility: HOSPITAL | Age: 52
End: 2022-02-28
Payer: OTHER GOVERNMENT

## 2022-05-25 ENCOUNTER — OFFICE VISIT (OUTPATIENT)
Dept: FAMILY MEDICINE | Facility: CLINIC | Age: 52
End: 2022-05-25
Payer: OTHER GOVERNMENT

## 2022-05-25 VITALS
SYSTOLIC BLOOD PRESSURE: 138 MMHG | BODY MASS INDEX: 37.56 KG/M2 | HEART RATE: 79 BPM | DIASTOLIC BLOOD PRESSURE: 84 MMHG | HEIGHT: 68 IN | OXYGEN SATURATION: 98 % | WEIGHT: 247.81 LBS

## 2022-05-25 DIAGNOSIS — M79.10 MYALGIA: ICD-10-CM

## 2022-05-25 DIAGNOSIS — R05.9 COUGH: ICD-10-CM

## 2022-05-25 DIAGNOSIS — J06.9 VIRAL URI: Primary | ICD-10-CM

## 2022-05-25 PROCEDURE — 99213 OFFICE O/P EST LOW 20 MIN: CPT | Mod: PBBFAC,PO | Performed by: PHYSICIAN ASSISTANT

## 2022-05-25 PROCEDURE — 99999 PR PBB SHADOW E&M-EST. PATIENT-LVL III: ICD-10-PCS | Mod: PBBFAC,,, | Performed by: PHYSICIAN ASSISTANT

## 2022-05-25 PROCEDURE — U0005 INFEC AGEN DETEC AMPLI PROBE: HCPCS | Performed by: PHYSICIAN ASSISTANT

## 2022-05-25 PROCEDURE — U0003 INFECTIOUS AGENT DETECTION BY NUCLEIC ACID (DNA OR RNA); SEVERE ACUTE RESPIRATORY SYNDROME CORONAVIRUS 2 (SARS-COV-2) (CORONAVIRUS DISEASE [COVID-19]), AMPLIFIED PROBE TECHNIQUE, MAKING USE OF HIGH THROUGHPUT TECHNOLOGIES AS DESCRIBED BY CMS-2020-01-R: HCPCS | Performed by: PHYSICIAN ASSISTANT

## 2022-05-25 PROCEDURE — 99214 PR OFFICE/OUTPT VISIT, EST, LEVL IV, 30-39 MIN: ICD-10-PCS | Mod: S$PBB,,, | Performed by: PHYSICIAN ASSISTANT

## 2022-05-25 PROCEDURE — 99214 OFFICE O/P EST MOD 30 MIN: CPT | Mod: S$PBB,,, | Performed by: PHYSICIAN ASSISTANT

## 2022-05-25 PROCEDURE — 99999 PR PBB SHADOW E&M-EST. PATIENT-LVL III: CPT | Mod: PBBFAC,,, | Performed by: PHYSICIAN ASSISTANT

## 2022-05-25 NOTE — PATIENT INSTRUCTIONS
A few reminders from today:    Start flonase  Start mucinex fast max all in one--day and night  Rest, hydrate    Follow up with me if needed.   Please go to ER/urgent care if after hours or symptoms persist/worsen.     Do not hesitate to get in touch with me should you have any further questions.     Thank you for trusting me with your care!  I wish you health and happiness.    -Jerrica Al PA-C

## 2022-05-25 NOTE — PROGRESS NOTES
Subjective:       Patient ID: Sam Morse is a 51 y.o. male.    Chief Complaint: Headache and Nasal Congestion    HPI     Pt is new to me, PCP Dr. Servin.     Pt is a 51 year old male with anxiety. He presents today complaining of nasal congestion and body aches x 3 days. A few of his kids had flu a couple weeks ago. Covid has gone around his office in the last few weeks.  Pt had flu and rapid covid test yesterday-- negative results. No fever, chills,  SOB, wheezing, chest pain, N/V/D.     Review of Systems   Constitutional: Positive for fatigue. Negative for activity change, appetite change, chills, fever and unexpected weight change.   HENT: Positive for nasal congestion, postnasal drip, rhinorrhea and sinus pressure/congestion. Negative for ear pain, hearing loss, sore throat and trouble swallowing.    Eyes: Negative for visual disturbance.   Respiratory: Positive for cough. Negative for chest tightness, shortness of breath and wheezing.    Cardiovascular: Negative for chest pain and palpitations.   Gastrointestinal: Negative for abdominal pain, change in bowel habit, constipation, diarrhea, nausea, vomiting, reflux and change in bowel habit.   Genitourinary: Negative for difficulty urinating, dysuria, erectile dysfunction, frequency and urgency.   Musculoskeletal: Positive for arthralgias and myalgias.   Integumentary:  Negative for rash.   Neurological: Negative for dizziness, vertigo, tremors, syncope, weakness, light-headedness and headaches.   Hematological: Does not bruise/bleed easily.   Psychiatric/Behavioral: Negative for dysphoric mood and sleep disturbance. The patient is not nervous/anxious.          Objective:      Physical Exam  Vitals and nursing note reviewed.   Constitutional:       General: He is not in acute distress.     Appearance: Normal appearance. He is obese. He is not ill-appearing, toxic-appearing or diaphoretic.   HENT:      Head: Normocephalic and atraumatic.      Right Ear:  Tympanic membrane, ear canal and external ear normal. There is no impacted cerumen.      Left Ear: Tympanic membrane, ear canal and external ear normal. There is no impacted cerumen.      Nose: Rhinorrhea present. No congestion.      Right Sinus: No maxillary sinus tenderness or frontal sinus tenderness.      Left Sinus: No maxillary sinus tenderness or frontal sinus tenderness.      Mouth/Throat:      Mouth: Mucous membranes are moist.      Pharynx: Oropharynx is clear. Posterior oropharyngeal erythema present. No oropharyngeal exudate.   Eyes:      General: No scleral icterus.        Right eye: No discharge.         Left eye: No discharge.      Extraocular Movements: Extraocular movements intact.      Conjunctiva/sclera: Conjunctivae normal.      Pupils: Pupils are equal, round, and reactive to light.   Cardiovascular:      Rate and Rhythm: Normal rate and regular rhythm.      Pulses: Normal pulses.      Heart sounds: Normal heart sounds. No murmur heard.    No friction rub. No gallop.   Pulmonary:      Effort: Pulmonary effort is normal. No respiratory distress.      Breath sounds: Normal breath sounds. No stridor. No wheezing, rhonchi or rales.   Abdominal:      General: Abdomen is flat. Bowel sounds are normal. There is no distension.      Palpations: Abdomen is soft. There is no mass.      Tenderness: There is no abdominal tenderness. There is no guarding or rebound.   Musculoskeletal:         General: No deformity or signs of injury. Normal range of motion.      Cervical back: Neck supple. No muscular tenderness.      Right lower leg: No edema.      Left lower leg: No edema.   Lymphadenopathy:      Cervical: No cervical adenopathy.   Skin:     General: Skin is warm.      Capillary Refill: Capillary refill takes less than 2 seconds.      Coloration: Skin is not jaundiced or pale.      Findings: No rash.   Neurological:      General: No focal deficit present.      Mental Status: He is alert and oriented to  person, place, and time. Mental status is at baseline.   Psychiatric:         Mood and Affect: Mood normal.         Behavior: Behavior normal.         Thought Content: Thought content normal.         Judgment: Judgment normal.         Assessment:       Problem List Items Addressed This Visit    None     Visit Diagnoses     Viral URI    -  Primary    Cough        Myalgia        Relevant Orders    COVID-19 Routine Screening          Plan:       1. Viral URI  -mucinex prn + flonase for drip, rest, hydrate    2. Cough  3. Myalgia  - COVID-19 Routine Screening; Future       RTC/ER precautions given. F/U if symptoms persist or worsen. Isolate until covid results return

## 2022-05-26 LAB
SARS-COV-2 RNA RESP QL NAA+PROBE: NOT DETECTED
SARS-COV-2- CYCLE NUMBER: NORMAL

## 2022-08-26 ENCOUNTER — TELEPHONE (OUTPATIENT)
Dept: FAMILY MEDICINE | Facility: CLINIC | Age: 52
End: 2022-08-26
Payer: OTHER GOVERNMENT

## 2022-08-26 ENCOUNTER — PATIENT MESSAGE (OUTPATIENT)
Dept: FAMILY MEDICINE | Facility: CLINIC | Age: 52
End: 2022-08-26
Payer: OTHER GOVERNMENT

## 2022-08-26 NOTE — TELEPHONE ENCOUNTER
----- Message from Bert Arredondo sent at 8/26/2022  8:34 AM CDT -----  Contact: pt at [phone]  Type: Needs Medical Advice  Who Called:  pt    Best Call Back Number: [phone]    Additional Information: py is calling the office stating he tested + for Covid on Tuesday. He been having body aches and congestion, but it states he is feeling it in his chest and concerned he need to come in. Please call back and advise.

## 2022-08-30 ENCOUNTER — OFFICE VISIT (OUTPATIENT)
Dept: FAMILY MEDICINE | Facility: CLINIC | Age: 52
End: 2022-08-30
Payer: OTHER GOVERNMENT

## 2022-08-30 DIAGNOSIS — B34.9 VIRAL ILLNESS: Primary | ICD-10-CM

## 2022-08-30 DIAGNOSIS — U07.1 COVID: ICD-10-CM

## 2022-08-30 DIAGNOSIS — J40 BRONCHITIS: ICD-10-CM

## 2022-08-30 PROCEDURE — 99213 PR OFFICE/OUTPT VISIT, EST, LEVL III, 20-29 MIN: ICD-10-PCS | Mod: 95,,, | Performed by: PHYSICIAN ASSISTANT

## 2022-08-30 PROCEDURE — 99213 OFFICE O/P EST LOW 20 MIN: CPT | Mod: 95,,, | Performed by: PHYSICIAN ASSISTANT

## 2022-08-30 RX ORDER — ALBUTEROL SULFATE 90 UG/1
2 AEROSOL, METERED RESPIRATORY (INHALATION) EVERY 4 HOURS PRN
Qty: 18 G | Refills: 5 | Status: SHIPPED | OUTPATIENT
Start: 2022-08-30 | End: 2023-07-21

## 2022-08-30 NOTE — PROGRESS NOTES
The patient location is: [place]  The chief complaint leading to consultation is: covid positive x 7 days   Cough and wheeze  Sx better x 2 days   Pt. Need letter to return to work  Pt. Needs note to return to work when well    Visit type: audiovisual    Face to Face time with patient: 15 min  25 minutes of total time spent on the encounter, which includes face to face time and non-face to face time preparing to see the patient (eg, review of tests), Obtaining and/or reviewing separately obtained history, Documenting clinical information in the electronic or other health record, Independently interpreting results (not separately reported) and communicating results to the patient/family/caregiver, or Care coordination (not separately reported).         Each patient to whom he or she provides medical services by telemedicine is:  (1) informed of the relationship between the physician and patient and the respective role of any other health care provider with respect to management of the patient; and (2) notified that he or she may decline to receive medical services by telemedicine and may withdraw from such care at any time.    Notes:    Answers submitted by the patient for this visit:  Review of Systems Questionnaire (Submitted on 8/29/2022)  activity change: Yes  unexpected weight change: No  neck pain: No  hearing loss: No  rhinorrhea: Yes  trouble swallowing: No  eye discharge: No  visual disturbance: No  chest tightness: Yes  wheezing: Yes  chest pain: No  palpitations: No  blood in stool: No  constipation: No  vomiting: No  diarrhea: No  polydipsia: No  polyuria: No  difficulty urinating: No  urgency: No  hematuria: No  joint swelling: No  arthralgias: Yes  headaches: Yes  weakness: Yes  confusion: No  dysphoric mood: Yes    Diagnoses and all orders for this visit:    Viral illness    Bronchitis  -     albuterol (PROVENTIL/VENTOLIN HFA) 90 mcg/actuation inhaler; Inhale 2 puffs into the lungs every 4 (four)  hours as needed for Wheezing or Shortness of Breath.    COVID     Discussed otc's  Isolate first 5 days of illness  Hydrate   Vaporizer  F/u notification from pt. Or revisit to get clearance to return to work

## 2022-08-30 NOTE — PROGRESS NOTES
The patient location is: [place]  The chief complaint leading to consultation is: covid positive x 7 days   Cough and wheeze  Sx better x 2 days   Pt. Need letter to return to work  Pt. Needs note to return to work when well    Visit type: audiovisual    Face to Face time with patient: 15 min  25 minutes of total time spent on the encounter, which includes face to face time and non-face to face time preparing to see the patient (eg, review of tests), Obtaining and/or reviewing separately obtained history, Documenting clinical information in the electronic or other health record, Independently interpreting results (not separately reported) and communicating results to the patient/family/caregiver, or Care coordination (not separately reported).         Each patient to whom he or she provides medical services by telemedicine is:  (1) informed of the relationship between the physician and patient and the respective role of any other health care provider with respect to management of the patient; and (2) notified that he or she may decline to receive medical services by telemedicine and may withdraw from such care at any time.    Notes:    Answers submitted by the patient for this visit:  Review of Systems Questionnaire (Submitted on 8/29/2022)  activity change: Yes  unexpected weight change: No  neck pain: No  hearing loss: No  rhinorrhea: Yes  trouble swallowing: No  eye discharge: No  visual disturbance: No  chest tightness: Yes  wheezing: Yes  chest pain: No  palpitations: No  blood in stool: No  constipation: No  vomiting: No  diarrhea: No  polydipsia: No  polyuria: No  difficulty urinating: No  urgency: No  hematuria: No  joint swelling: No  arthralgias: Yes  headaches: Yes  weakness: Yes  confusion: No  dysphoric mood: Yes    Diagnoses and all orders for this visit:    Viral illness    Bronchitis  -     albuterol (PROVENTIL/VENTOLIN HFA) 90 mcg/actuation inhaler; Inhale 2 puffs into the lungs every 4 (four)  hours as needed for Wheezing or Shortness of Breath.    COVID     Discussed otc's  Isolate first 5 days of illness  Hydrate   Vaporizer  F/u notification from pt. Or revisit to get clearance to return to work  Answers submitted by the patient for this visit:  Review of Systems Questionnaire (Submitted on 8/29/2022)  activity change: Yes  unexpected weight change: No  neck pain: No  hearing loss: No  rhinorrhea: Yes  trouble swallowing: No  eye discharge: No  visual disturbance: No  chest tightness: Yes  wheezing: Yes  chest pain: No  palpitations: No  blood in stool: No  constipation: No  vomiting: No  diarrhea: No  polydipsia: No  polyuria: No  difficulty urinating: No  urgency: No  hematuria: No  joint swelling: No  arthralgias: Yes  headaches: Yes  weakness: Yes  confusion: No  dysphoric mood: Yes

## 2022-09-02 ENCOUNTER — OFFICE VISIT (OUTPATIENT)
Dept: FAMILY MEDICINE | Facility: CLINIC | Age: 52
End: 2022-09-02
Payer: OTHER GOVERNMENT

## 2022-09-02 VITALS
HEART RATE: 88 BPM | WEIGHT: 246.69 LBS | OXYGEN SATURATION: 98 % | BODY MASS INDEX: 37.51 KG/M2 | SYSTOLIC BLOOD PRESSURE: 128 MMHG | DIASTOLIC BLOOD PRESSURE: 80 MMHG

## 2022-09-02 DIAGNOSIS — R05.3 POST-COVID CHRONIC COUGH: Primary | ICD-10-CM

## 2022-09-02 DIAGNOSIS — U09.9 POST-COVID CHRONIC COUGH: Primary | ICD-10-CM

## 2022-09-02 PROCEDURE — 99213 PR OFFICE/OUTPT VISIT, EST, LEVL III, 20-29 MIN: ICD-10-PCS | Mod: S$PBB,,, | Performed by: INTERNAL MEDICINE

## 2022-09-02 PROCEDURE — 99213 OFFICE O/P EST LOW 20 MIN: CPT | Mod: S$PBB,,, | Performed by: INTERNAL MEDICINE

## 2022-09-02 PROCEDURE — 99999 PR PBB SHADOW E&M-EST. PATIENT-LVL III: CPT | Mod: PBBFAC,,, | Performed by: INTERNAL MEDICINE

## 2022-09-02 PROCEDURE — 99213 OFFICE O/P EST LOW 20 MIN: CPT | Mod: PBBFAC,PO | Performed by: INTERNAL MEDICINE

## 2022-09-02 PROCEDURE — 99999 PR PBB SHADOW E&M-EST. PATIENT-LVL III: ICD-10-PCS | Mod: PBBFAC,,, | Performed by: INTERNAL MEDICINE

## 2022-09-02 RX ORDER — PROMETHAZINE HYDROCHLORIDE AND DEXTROMETHORPHAN HYDROBROMIDE 6.25; 15 MG/5ML; MG/5ML
5 SYRUP ORAL EVERY 4 HOURS PRN
Qty: 240 ML | Refills: 0 | Status: SHIPPED | OUTPATIENT
Start: 2022-09-02 | End: 2022-09-12

## 2022-09-02 RX ORDER — BENZONATATE 200 MG/1
200 CAPSULE ORAL 3 TIMES DAILY PRN
Qty: 30 CAPSULE | Refills: 0 | Status: SHIPPED | OUTPATIENT
Start: 2022-09-02 | End: 2022-09-12

## 2022-09-02 NOTE — LETTER
September 2, 2022          No Recipients             [facility]  [address]  [facility] [address]  Phone: [phone]  Fax: [phone]   Patient: Sam Morse   MR Number: [id number]   YOB: 1970   Date of Visit: 9/2/2022       To whom it may concern,    I had the pleasure of seeing Mr. Sam Morse in clinic today.  He is recovering from COVID 19 in the usual fashion and I do request that he be placed on light duty for the next 3 weeks as he continues to recover.  Please call with any questions.      Sincerely,      Sterling Garcia MD            CC    No Recipients    Enclosure

## 2022-09-02 NOTE — PROGRESS NOTES
Patient ID: Sam Morse     Chief Complaint:   Chief Complaint   Patient presents with    COVID-19 Post Vaccine Symptoms     Not feeling well        HPI:  New patient to me with a relatively new problem.  He tested positive for COVID-19 approximately 10-11 days ago and had typical symptoms of nonproductive cough fatigue nasal congestion and some subjective fevers.  He did think he was sick enough for the oral antiviral so he letter tried until the coughing became so much that he had to do a virtual visit and he was given an albuterol inhaler.  Unfortunately, he has a work trip upcoming but he is going to have to skip that because he and I both agree that he is in not in a condition to travel.  Currently though he does have a significant cough and fatigue that is quite pronounced.  I told him that it will take time for the fatigue to resolve and that doing normal activities will help it resolve faster.  I do not think he needs physical therapy at this point but if he would like to walk on a treadmill that can help.  For the cough though, I would like to give him some Tessalon Perles in the morning and some Phenergan cough syrup in the evening with appropriate sedation precautions.  He can use his albuterol inhaler during the day as needed as it might be giving him some benefit.  He does ask that I document things fully and provide a letter for his employer to allow him to be on light duty for the next 3 weeks and I think this is a fair request.    Review of Systems   Constitutional: Negative.    HENT: Negative.     Eyes: Negative.    Respiratory: Negative.     Cardiovascular: Negative.    Gastrointestinal: Negative.    Endocrine: Negative.    Genitourinary: Negative.    Musculoskeletal: Negative.    Skin: Negative.    Allergic/Immunologic: Negative.    Neurological: Negative.    Hematological: Negative.    Psychiatric/Behavioral: Negative.          Objective:      Physical Exam   Physical Exam  Vitals and nursing  note reviewed.   Constitutional:       Appearance: Normal appearance. He is well-developed.   HENT:      Head: Normocephalic and atraumatic.      Nose: Nose normal.   Eyes:      Extraocular Movements: Extraocular movements intact.      Conjunctiva/sclera: Conjunctivae normal.      Pupils: Pupils are equal, round, and reactive to light.   Cardiovascular:      Rate and Rhythm: Normal rate and regular rhythm.      Pulses: Normal pulses.      Heart sounds: Normal heart sounds.   Pulmonary:      Effort: Pulmonary effort is normal.      Breath sounds: Normal breath sounds.   Abdominal:      General: Bowel sounds are normal.      Palpations: Abdomen is soft.   Musculoskeletal:         General: Normal range of motion.      Cervical back: Normal range of motion and neck supple.   Skin:     General: Skin is warm and dry.      Capillary Refill: Capillary refill takes less than 2 seconds.   Neurological:      General: No focal deficit present.      Mental Status: He is alert and oriented to person, place, and time.   Psychiatric:         Mood and Affect: Mood normal.         Behavior: Behavior normal.         Thought Content: Thought content normal.         Judgment: Judgment normal.          Vitals:   Vitals:    09/02/22 1524   BP: 128/80   BP Location: Left arm   Patient Position: Sitting   Pulse: 88   SpO2: 98%   Weight: 111.9 kg (246 lb 11.1 oz)          Current Outpatient Medications:     albuterol (PROVENTIL/VENTOLIN HFA) 90 mcg/actuation inhaler, Inhale 2 puffs into the lungs every 4 (four) hours as needed for Wheezing or Shortness of Breath., Disp: 18 g, Rfl: 5    busPIRone (BUSPAR) 7.5 MG tablet, 7.5 mg po BID as needed for anxiety., Disp: 30 tablet, Rfl: 2    LORazepam (ATIVAN) 0.5 MG tablet, Take 0.5 mg by mouth every 12 (twelve) hours as needed for Anxiety., Disp: , Rfl:     benzonatate (TESSALON) 200 MG capsule, Take 1 capsule (200 mg total) by mouth 3 (three) times daily as needed for Cough., Disp: 30 capsule,  Rfl: 0    promethazine-dextromethorphan (PROMETHAZINE-DM) 6.25-15 mg/5 mL Syrp, Take 5 mLs by mouth every 4 (four) hours as needed (cough)., Disp: 240 mL, Rfl: 0   Assessment:       Patient Active Problem List    Diagnosis Date Noted    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    Diagnoses and all orders for this visit:    Sam was seen today for covid-19 post vaccine symptoms.    Diagnoses and all orders for this visit:    Post-COVID chronic cough  -     benzonatate (TESSALON) 200 MG capsule; Take 1 capsule (200 mg total) by mouth 3 (three) times daily as needed for Cough.  -     promethazine-dextromethorphan (PROMETHAZINE-DM) 6.25-15 mg/5 mL Syrp; Take 5 mLs by mouth every 4 (four) hours as needed (cough).   Letter for work

## 2023-07-21 ENCOUNTER — OFFICE VISIT (OUTPATIENT)
Dept: FAMILY MEDICINE | Facility: CLINIC | Age: 53
End: 2023-07-21
Payer: OTHER GOVERNMENT

## 2023-07-21 VITALS
WEIGHT: 251.13 LBS | DIASTOLIC BLOOD PRESSURE: 98 MMHG | HEIGHT: 68 IN | OXYGEN SATURATION: 96 % | SYSTOLIC BLOOD PRESSURE: 138 MMHG | BODY MASS INDEX: 38.06 KG/M2 | HEART RATE: 84 BPM

## 2023-07-21 DIAGNOSIS — M25.571 ACUTE RIGHT ANKLE PAIN: Primary | ICD-10-CM

## 2023-07-21 PROBLEM — Z12.11 COLON CANCER SCREENING: Status: RESOLVED | Noted: 2019-06-17 | Resolved: 2023-07-21

## 2023-07-21 PROBLEM — U09.9 POST-COVID CHRONIC COUGH: Status: RESOLVED | Noted: 2022-09-02 | Resolved: 2023-07-21

## 2023-07-21 PROBLEM — R05.3 POST-COVID CHRONIC COUGH: Status: RESOLVED | Noted: 2022-09-02 | Resolved: 2023-07-21

## 2023-07-21 PROCEDURE — 99213 PR OFFICE/OUTPT VISIT, EST, LEVL III, 20-29 MIN: ICD-10-PCS | Mod: S$PBB,,, | Performed by: FAMILY MEDICINE

## 2023-07-21 PROCEDURE — 99213 OFFICE O/P EST LOW 20 MIN: CPT | Mod: S$PBB,,, | Performed by: FAMILY MEDICINE

## 2023-07-21 PROCEDURE — 99999 PR PBB SHADOW E&M-EST. PATIENT-LVL IV: CPT | Mod: PBBFAC,,, | Performed by: FAMILY MEDICINE

## 2023-07-21 PROCEDURE — 99999 PR PBB SHADOW E&M-EST. PATIENT-LVL IV: ICD-10-PCS | Mod: PBBFAC,,, | Performed by: FAMILY MEDICINE

## 2023-07-21 PROCEDURE — 99214 OFFICE O/P EST MOD 30 MIN: CPT | Mod: PBBFAC,PO | Performed by: FAMILY MEDICINE

## 2023-07-21 RX ORDER — METHYLPREDNISOLONE 4 MG/1
TABLET ORAL
Qty: 21 EACH | Refills: 0 | Status: SHIPPED | OUTPATIENT
Start: 2023-07-21 | End: 2023-08-11

## 2023-07-21 NOTE — PROGRESS NOTES
Subjective:       Patient ID: Sam Morse is a 52 y.o. male.    Chief Complaint: Foot Injury    Right foot pain since Monday.  No accident or trauma.      Foot Injury   Review of Systems   Constitutional:  Negative for chills and fever.   Respiratory:  Negative for cough, chest tightness and shortness of breath.    Cardiovascular:  Negative for chest pain, palpitations and leg swelling.   Endocrine: Negative for cold intolerance and heat intolerance.   Musculoskeletal:  Positive for arthralgias.   Psychiatric/Behavioral:  Negative for decreased concentration. The patient is not nervous/anxious.      Objective:      Physical Exam  Vitals and nursing note reviewed.   Constitutional:       Appearance: He is well-developed.   HENT:      Head: Normocephalic and atraumatic.   Cardiovascular:      Rate and Rhythm: Normal rate and regular rhythm.      Heart sounds: Normal heart sounds.   Pulmonary:      Effort: Pulmonary effort is normal.      Breath sounds: Normal breath sounds.   Musculoskeletal:      Comments: Posterior heel pain; no deformity noted       Assessment:       1. Acute right ankle pain        Plan:       Acute right ankle pain  -     Ambulatory referral/consult to Podiatry; Future; Expected date: 07/28/2023  -     Ambulatory referral/consult to Orthopedics; Future; Expected date: 07/28/2023    Other orders  -     methylPREDNISolone (MEDROL DOSEPACK) 4 mg tablet; use as directed  Dispense: 21 each; Refill: 0        Wellness with labs soon  Treat and monitor  To ortho Monday if no improvement  Return precautions given  Follow up if symptoms worsen or fail to improve.

## 2023-07-24 DIAGNOSIS — M25.571 RIGHT ANKLE PAIN, UNSPECIFIED CHRONICITY: Primary | ICD-10-CM

## 2023-12-22 ENCOUNTER — OFFICE VISIT (OUTPATIENT)
Dept: URGENT CARE | Facility: CLINIC | Age: 53
End: 2023-12-22
Payer: OTHER GOVERNMENT

## 2023-12-22 VITALS
HEART RATE: 61 BPM | DIASTOLIC BLOOD PRESSURE: 85 MMHG | BODY MASS INDEX: 35.55 KG/M2 | OXYGEN SATURATION: 98 % | TEMPERATURE: 98 F | HEIGHT: 69 IN | RESPIRATION RATE: 16 BRPM | WEIGHT: 240 LBS | SYSTOLIC BLOOD PRESSURE: 144 MMHG

## 2023-12-22 DIAGNOSIS — L98.9 SKIN LESION OF NECK: Primary | ICD-10-CM

## 2023-12-22 DIAGNOSIS — Z76.89 ENCOUNTER TO ESTABLISH CARE: ICD-10-CM

## 2023-12-22 PROCEDURE — 99203 OFFICE O/P NEW LOW 30 MIN: CPT | Mod: S$GLB,,, | Performed by: NURSE PRACTITIONER

## 2023-12-22 PROCEDURE — 99203 PR OFFICE/OUTPT VISIT, NEW, LEVL III, 30-44 MIN: ICD-10-PCS | Mod: S$GLB,,, | Performed by: NURSE PRACTITIONER

## 2023-12-22 RX ORDER — MUPIROCIN 20 MG/G
OINTMENT TOPICAL 3 TIMES DAILY
Qty: 1 EACH | Refills: 0 | Status: SHIPPED | OUTPATIENT
Start: 2023-12-22 | End: 2023-12-27

## 2023-12-22 NOTE — PROGRESS NOTES
"Subjective:      Patient ID: Sam Morse is a 53 y.o. male.    Vitals:  height is 5' 9" (1.753 m) and weight is 108.9 kg (240 lb). His oral temperature is 97.8 °F (36.6 °C). His blood pressure is 144/85 (abnormal) and his pulse is 61. His respiration is 16 and oxygen saturation is 98%.     Chief Complaint: Recurrent Skin Infections    Pt present today c/o of boil on his neck. Noticed it six day ago. Pt says he think its infected, it started off as a little bump.     Mass  This is a new problem. The current episode started in the past 7 days. The problem occurs constantly. The problem has been unchanged. Pertinent negatives include no abdominal pain, arthralgias, chest pain, chills, coughing, fatigue, fever, joint swelling, nausea, neck pain, rash or vomiting. Nothing aggravates the symptoms. He has tried nothing for the symptoms. The treatment provided no relief.       Constitution: Negative. Negative for chills, fatigue and fever.   HENT:  Negative for ear pain, ear discharge, facial swelling and sinus pressure.    Neck: Negative for neck pain, neck stiffness and painful lymph nodes.   Cardiovascular: Negative.  Negative for chest pain and sob on exertion.   Eyes: Negative.  Negative for eye itching, eye pain and eye redness.   Respiratory: Negative.  Negative for chest tightness, cough, shortness of breath, wheezing and asthma.    Gastrointestinal: Negative.  Negative for abdominal pain, nausea and vomiting.   Endocrine: negative. excessive thirst.   Genitourinary: Negative.  Negative for dysuria, frequency, urgency and flank pain.   Musculoskeletal: Negative.  Negative for pain, trauma, joint pain and joint swelling.   Skin:  Positive for lesion. Negative for rash, wound, erythema and hives.   Allergic/Immunologic: Negative.  Negative for eczema, asthma, hives, itching and sneezing.   Neurological: Negative.  Negative for dizziness, passing out, disorientation and altered mental status. "   Hematologic/Lymphatic: Negative.  Negative for swollen lymph nodes.   Psychiatric/Behavioral: Negative.  Negative for altered mental status, disorientation and confusion.       Objective:     Physical Exam   Constitutional: He is oriented to person, place, and time. He appears well-developed.  Non-toxic appearance. He does not appear ill. No distress.   HENT:   Head: Normocephalic and atraumatic. Head is without abrasion, without contusion and without laceration.   Ears:   Right Ear: External ear normal.   Left Ear: External ear normal.   Nose: Nose normal.   Mouth/Throat: Oropharynx is clear and moist and mucous membranes are normal.   Eyes: Conjunctivae, EOM and lids are normal. Pupils are equal, round, and reactive to light.   Neck: Trachea normal and phonation normal. Neck supple.   Cardiovascular: Normal rate, regular rhythm and normal heart sounds.   Pulmonary/Chest: Effort normal and breath sounds normal. No respiratory distress. He has no wheezes. He has no rhonchi. He has no rales. He exhibits no tenderness.   Musculoskeletal: Normal range of motion.         General: Normal range of motion.   Neurological: no focal deficit. He is alert, oriented to person, place, and time and at baseline.   Skin: Skin is warm, dry, intact, not diaphoretic and no rash. Capillary refill takes less than 2 seconds. lesion (0.5 cm x 0.5 cm hard non movable lesion with rolled border to left neck area.) No abrasion, No burn, No bruising, No erythema and No ecchymosis   Psychiatric: His speech is normal and behavior is normal. Mood, judgment and thought content normal.   Nursing note and vitals reviewed.      Assessment:     1. Skin lesion of neck    2. Encounter to establish care        Plan:   FOLLOWUP  Follow up if symptoms worsen or fail to improve, for PLEASE CONTACT PCP OR CONTACT THE EMERGENCY ROOM..     PATIENT INSTRUCTIONS  Patient Instructions   Follow-up with dermatology/internal medicine as referred.        INSTRUCTIONS:  - Rest.  - Drink plenty of fluids.  - Take Tylenol and/or Ibuprofen as directed as needed for fever/pain.  Do not take more than the recommended dose.  - follow up with your PCP within the next 1-2 weeks as needed.  - You must understand that you have received an Urgent Care treatment only and that you may be released before all of your medical problems are known or treated.   - You, the patient, will arrange for follow up care as instructed.   - If your condition worsens or fails to improve we recommend that you receive another evaluation at the ER immediately or contact your PCP to discuss your concerns.   - You can call [phone] or [phone] to help schedule an appointment with the appropriate provider.     -If you smoke cigarettes, it would be beneficial for you to stop.         THANK YOU FOR ALLOWING ME TO PARTICIPATE IN YOUR HEALTHCARE,     Anthony Vuong NP     Skin lesion of neck  -     Ambulatory referral/consult to Dermatology  -     mupirocin (BACTROBAN) 2 % ointment; Apply topically 3 (three) times daily. for 5 days  Dispense: 1 each; Refill: 0    Encounter to establish care  -     Ambulatory referral/consult to Internal Medicine

## 2023-12-23 NOTE — PATIENT INSTRUCTIONS
Follow-up with dermatology/internal medicine as referred.       INSTRUCTIONS:  - Rest.  - Drink plenty of fluids.  - Take Tylenol and/or Ibuprofen as directed as needed for fever/pain.  Do not take more than the recommended dose.  - follow up with your PCP within the next 1-2 weeks as needed.  - You must understand that you have received an Urgent Care treatment only and that you may be released before all of your medical problems are known or treated.   - You, the patient, will arrange for follow up care as instructed.   - If your condition worsens or fails to improve we recommend that you receive another evaluation at the ER immediately or contact your PCP to discuss your concerns.   - You can call [phone] or [phone] to help schedule an appointment with the appropriate provider.     -If you smoke cigarettes, it would be beneficial for you to stop.

## 2023-12-26 ENCOUNTER — PATIENT MESSAGE (OUTPATIENT)
Dept: DERMATOLOGY | Facility: CLINIC | Age: 53
End: 2023-12-26
Payer: OTHER GOVERNMENT

## 2024-04-03 ENCOUNTER — OFFICE VISIT (OUTPATIENT)
Dept: FAMILY MEDICINE | Facility: CLINIC | Age: 54
End: 2024-04-03
Payer: OTHER GOVERNMENT

## 2024-04-03 VITALS
DIASTOLIC BLOOD PRESSURE: 82 MMHG | SYSTOLIC BLOOD PRESSURE: 144 MMHG | BODY MASS INDEX: 37.33 KG/M2 | WEIGHT: 252 LBS | TEMPERATURE: 99 F | HEART RATE: 65 BPM | OXYGEN SATURATION: 98 % | HEIGHT: 69 IN

## 2024-04-03 DIAGNOSIS — Z00.00 WELLNESS EXAMINATION: Primary | ICD-10-CM

## 2024-04-03 DIAGNOSIS — I10 ESSENTIAL HYPERTENSION: ICD-10-CM

## 2024-04-03 DIAGNOSIS — E66.01 SEVERE OBESITY (BMI 35.0-39.9) WITH COMORBIDITY: ICD-10-CM

## 2024-04-03 PROCEDURE — 99396 PREV VISIT EST AGE 40-64: CPT | Mod: S$PBB,,, | Performed by: FAMILY MEDICINE

## 2024-04-03 PROCEDURE — 99214 OFFICE O/P EST MOD 30 MIN: CPT | Mod: PBBFAC,PO | Performed by: FAMILY MEDICINE

## 2024-04-03 PROCEDURE — 99999 PR PBB SHADOW E&M-EST. PATIENT-LVL IV: CPT | Mod: PBBFAC,,, | Performed by: FAMILY MEDICINE

## 2024-04-03 RX ORDER — LOSARTAN POTASSIUM AND HYDROCHLOROTHIAZIDE 12.5; 5 MG/1; MG/1
1 TABLET ORAL DAILY
Qty: 90 TABLET | Refills: 3 | Status: SHIPPED | OUTPATIENT
Start: 2024-04-03 | End: 2025-04-03

## 2024-04-03 NOTE — PROGRESS NOTES
"Subjective:       Patient ID: Sam Morse is a 53 y.o. male.    Chief Complaint: Establish Care    HPI:  Pleasant 53-year-old gentleman here today to establish care.  Has been awhile since he has had a physical.  Blood pressure has been creeping up over the years.  I suggest begin some treat very low-dose safe medication.  He can get his weight proximally 40-50 lb can discontinue blood pressure medication.  Recommend Mediterranean diet, lower portion sizes, increase activity.  No overt symptoms of CAD or CHF occurring.  Review of systems otherwise noncontributory.  Labs are due.  Some immunizations need to be verified under on the wrap up portion chart.    Review of Systems   Constitutional: Negative.    HENT: Negative.     Eyes: Negative.    Respiratory: Negative.     Cardiovascular: Negative.    Gastrointestinal: Negative.    Endocrine: Negative.    Genitourinary: Negative.    Musculoskeletal: Negative.    Skin: Negative.    Allergic/Immunologic: Negative.    Neurological: Negative.    Hematological: Negative.    Psychiatric/Behavioral: Negative.         Objective:      Vitals:    04/03/24 1422   BP: (!) 144/82   Pulse: 65   Temp: 99.3 °F (37.4 °C)   TempSrc: Oral   SpO2: 98%   Weight: 114.3 kg (251 lb 15.8 oz)   Height: 5' 9" (1.753 m)      Physical Exam  Vitals and nursing note reviewed.   Constitutional:       Appearance: Normal appearance. He is obese.   HENT:      Head: Normocephalic and atraumatic.      Nose: Nose normal.      Mouth/Throat:      Mouth: Mucous membranes are moist.      Pharynx: Oropharynx is clear.   Eyes:      Extraocular Movements: Extraocular movements intact.      Conjunctiva/sclera: Conjunctivae normal.      Pupils: Pupils are equal, round, and reactive to light.   Cardiovascular:      Rate and Rhythm: Normal rate and regular rhythm.   Pulmonary:      Effort: Pulmonary effort is normal.      Breath sounds: Normal breath sounds.   Abdominal:      General: Abdomen is flat. Bowel sounds " are normal.      Palpations: Abdomen is soft.   Musculoskeletal:         General: Normal range of motion.      Cervical back: Normal range of motion and neck supple.   Skin:     General: Skin is warm and dry.      Capillary Refill: Capillary refill takes less than 2 seconds.   Neurological:      General: No focal deficit present.      Mental Status: He is alert and oriented to person, place, and time.   Psychiatric:         Mood and Affect: Mood normal.         Behavior: Behavior normal.         Thought Content: Thought content normal.         Judgment: Judgment normal.         Results for orders placed or performed in visit on 05/25/22   COVID-19 Routine Screening   Result Value Ref Range    SARS-CoV2 (COVID-19) Qualitative PCR Not Detected Not Detected   SARS-COV-2- Cycle Number   Result Value Ref Range    SARS-COV-2- Cycle Number N/A       Assessment:       1. Wellness examination    2. Severe obesity (BMI 35.0-39.9) with comorbidity    3. Essential hypertension        Plan:       Wellness examination  -     Hemoglobin A1C; Future; Expected date: 04/03/2024  -     Lipid Panel; Future; Expected date: 04/03/2024  -     PSA, Screening; Future; Expected date: 04/03/2024  -     HIV 1/2 Ag/Ab (4th Gen); Future; Expected date: 04/03/2024  -     Hepatitis C Antibody; Future; Expected date: 04/03/2024    Severe obesity (BMI 35.0-39.9) with comorbidity  -     Hemoglobin A1C; Future; Expected date: 04/03/2024    Essential hypertension  -     CBC Auto Differential; Future; Expected date: 04/03/2024  -     Comprehensive Metabolic Panel; Future; Expected date: 04/03/2024  -     losartan-hydrochlorothiazide 50-12.5 mg (HYZAAR) 50-12.5 mg per tablet; Take 1 tablet by mouth once daily.  Dispense: 90 tablet; Refill: 3

## 2024-04-03 NOTE — PATIENT INSTRUCTIONS
Ask your pharmacist about the following vaccines.  Ask about shingles vaccine, flu shot, COVID-19 vaccine completion and tetanus vaccine Tdap.

## 2024-04-09 ENCOUNTER — LAB VISIT (OUTPATIENT)
Dept: LAB | Facility: HOSPITAL | Age: 54
End: 2024-04-09
Attending: FAMILY MEDICINE
Payer: OTHER GOVERNMENT

## 2024-04-09 DIAGNOSIS — E66.01 SEVERE OBESITY (BMI 35.0-39.9) WITH COMORBIDITY: ICD-10-CM

## 2024-04-09 DIAGNOSIS — I10 ESSENTIAL HYPERTENSION: ICD-10-CM

## 2024-04-09 DIAGNOSIS — Z00.00 WELLNESS EXAMINATION: ICD-10-CM

## 2024-04-09 LAB
ALBUMIN SERPL BCP-MCNC: 4.1 G/DL (ref 3.5–5.2)
ALP SERPL-CCNC: 51 U/L (ref 55–135)
ALT SERPL W/O P-5'-P-CCNC: 35 U/L (ref 10–44)
ANION GAP SERPL CALC-SCNC: 6 MMOL/L (ref 8–16)
AST SERPL-CCNC: 22 U/L (ref 10–40)
BASOPHILS # BLD AUTO: 0.02 K/UL (ref 0–0.2)
BASOPHILS NFR BLD: 0.3 % (ref 0–1.9)
BILIRUB SERPL-MCNC: 0.7 MG/DL (ref 0.1–1)
BUN SERPL-MCNC: 14 MG/DL (ref 6–20)
CALCIUM SERPL-MCNC: 9.1 MG/DL (ref 8.7–10.5)
CHLORIDE SERPL-SCNC: 106 MMOL/L (ref 95–110)
CHOLEST SERPL-MCNC: 195 MG/DL (ref 120–199)
CHOLEST/HDLC SERPL: 5.9 {RATIO} (ref 2–5)
CO2 SERPL-SCNC: 27 MMOL/L (ref 23–29)
COMPLEXED PSA SERPL-MCNC: 0.54 NG/ML (ref 0–4)
CREAT SERPL-MCNC: 1 MG/DL (ref 0.5–1.4)
DIFFERENTIAL METHOD BLD: NORMAL
EOSINOPHIL # BLD AUTO: 0.1 K/UL (ref 0–0.5)
EOSINOPHIL NFR BLD: 1.2 % (ref 0–8)
ERYTHROCYTE [DISTWIDTH] IN BLOOD BY AUTOMATED COUNT: 11.8 % (ref 11.5–14.5)
EST. GFR  (NO RACE VARIABLE): >60 ML/MIN/1.73 M^2
ESTIMATED AVG GLUCOSE: 117 MG/DL (ref 68–131)
GLUCOSE SERPL-MCNC: 109 MG/DL (ref 70–110)
HBA1C MFR BLD: 5.7 % (ref 4–5.6)
HCT VFR BLD AUTO: 47.4 % (ref 40–54)
HCV AB SERPL QL IA: NORMAL
HDLC SERPL-MCNC: 33 MG/DL (ref 40–75)
HDLC SERPL: 16.9 % (ref 20–50)
HGB BLD-MCNC: 16 G/DL (ref 14–18)
HIV 1+2 AB+HIV1 P24 AG SERPL QL IA: NORMAL
IMM GRANULOCYTES # BLD AUTO: 0.01 K/UL (ref 0–0.04)
IMM GRANULOCYTES NFR BLD AUTO: 0.2 % (ref 0–0.5)
LDLC SERPL CALC-MCNC: 132.2 MG/DL (ref 63–159)
LYMPHOCYTES # BLD AUTO: 2.1 K/UL (ref 1–4.8)
LYMPHOCYTES NFR BLD: 36.3 % (ref 18–48)
MCH RBC QN AUTO: 30.5 PG (ref 27–31)
MCHC RBC AUTO-ENTMCNC: 33.8 G/DL (ref 32–36)
MCV RBC AUTO: 90 FL (ref 82–98)
MONOCYTES # BLD AUTO: 0.4 K/UL (ref 0.3–1)
MONOCYTES NFR BLD: 6.9 % (ref 4–15)
NEUTROPHILS # BLD AUTO: 3.2 K/UL (ref 1.8–7.7)
NEUTROPHILS NFR BLD: 55.1 % (ref 38–73)
NONHDLC SERPL-MCNC: 162 MG/DL
NRBC BLD-RTO: 0 /100 WBC
PLATELET # BLD AUTO: 183 K/UL (ref 150–450)
PMV BLD AUTO: 11 FL (ref 9.2–12.9)
POTASSIUM SERPL-SCNC: 4.2 MMOL/L (ref 3.5–5.1)
PROT SERPL-MCNC: 6.8 G/DL (ref 6–8.4)
RBC # BLD AUTO: 5.25 M/UL (ref 4.6–6.2)
SODIUM SERPL-SCNC: 139 MMOL/L (ref 136–145)
TRIGL SERPL-MCNC: 149 MG/DL (ref 30–150)
WBC # BLD AUTO: 5.78 K/UL (ref 3.9–12.7)

## 2024-04-09 PROCEDURE — 80053 COMPREHEN METABOLIC PANEL: CPT | Performed by: FAMILY MEDICINE

## 2024-04-09 PROCEDURE — 86803 HEPATITIS C AB TEST: CPT | Performed by: FAMILY MEDICINE

## 2024-04-09 PROCEDURE — 83036 HEMOGLOBIN GLYCOSYLATED A1C: CPT | Performed by: FAMILY MEDICINE

## 2024-04-09 PROCEDURE — 84153 ASSAY OF PSA TOTAL: CPT | Performed by: FAMILY MEDICINE

## 2024-04-09 PROCEDURE — 85025 COMPLETE CBC W/AUTO DIFF WBC: CPT | Performed by: FAMILY MEDICINE

## 2024-04-09 PROCEDURE — 80061 LIPID PANEL: CPT | Performed by: FAMILY MEDICINE

## 2024-04-09 PROCEDURE — 87389 HIV-1 AG W/HIV-1&-2 AB AG IA: CPT | Performed by: FAMILY MEDICINE

## 2024-04-09 PROCEDURE — 36415 COLL VENOUS BLD VENIPUNCTURE: CPT | Mod: PO | Performed by: FAMILY MEDICINE

## 2024-10-04 ENCOUNTER — OFFICE VISIT (OUTPATIENT)
Dept: FAMILY MEDICINE | Facility: CLINIC | Age: 54
End: 2024-10-04
Payer: OTHER GOVERNMENT

## 2024-10-04 VITALS
HEIGHT: 69 IN | WEIGHT: 245.56 LBS | BODY MASS INDEX: 36.37 KG/M2 | SYSTOLIC BLOOD PRESSURE: 120 MMHG | HEART RATE: 71 BPM | DIASTOLIC BLOOD PRESSURE: 70 MMHG | OXYGEN SATURATION: 97 %

## 2024-10-04 DIAGNOSIS — F41.1 GAD (GENERALIZED ANXIETY DISORDER): ICD-10-CM

## 2024-10-04 DIAGNOSIS — I10 ESSENTIAL HYPERTENSION: Primary | ICD-10-CM

## 2024-10-04 DIAGNOSIS — E66.01 SEVERE OBESITY (BMI 35.0-39.9) WITH COMORBIDITY: ICD-10-CM

## 2024-10-04 PROCEDURE — 99213 OFFICE O/P EST LOW 20 MIN: CPT | Mod: PBBFAC,PO | Performed by: FAMILY MEDICINE

## 2024-10-04 PROCEDURE — 99999 PR PBB SHADOW E&M-EST. PATIENT-LVL III: CPT | Mod: PBBFAC,,, | Performed by: FAMILY MEDICINE

## 2024-10-04 NOTE — PROGRESS NOTES
Patient ID: Sam Morse is a 53 y.o. male.    Chief Complaint: wellness (6 month f/u)    History of Present Illness    CHIEF COMPLAINT:  Sam presents today for follow up.    CARDIOVASCULAR HEALTH:  He reports well-controlled blood pressure on ARB diuretic combination therapy. He denies chest pain or shortness of breath. LDL cholesterol is above 100, with an upward trend noted.    WEIGHT MANAGEMENT:  He is considering [employer] for weight management and inquiring about Zepbound as a potential weight loss aid. He understands that weight loss could help prevent chronic diseases and potentially allow discontinuation of blood pressure medication.    MENTAL HEALTH:  He reports generalized anxiety is controlled without medications. His current career is described as both exciting and stressful, but he denies needing treatment for occupational stress at this time.    HEALTH MAINTENANCE:  He is due for immunizations as per recommended schedule.      ROS:  General: -fever, -chills, -fatigue, -weight gain, -weight loss  Eyes: -vision changes, -redness, -discharge  ENT: -ear pain, -nasal congestion, -sore throat  Cardiovascular: -chest pain, -palpitations, -lower extremity edema  Respiratory: -cough, -shortness of breath  Gastrointestinal: -abdominal pain, -nausea, -vomiting, -diarrhea, -constipation, -blood in stool  Genitourinary: -dysuria, -hematuria, -frequency  Musculoskeletal: -joint pain, -muscle pain  Skin: -rash, -lesion  Neurological: -headache, -dizziness, -numbness, -tingling  Psychiatric: +anxiety, -depression, -sleep difficulty         Physical Exam    General: No acute distress. Well-developed. Well-nourished.  Eyes: EOMI. Sclerae anicteric.  HENT: Normocephalic. Atraumatic. Nares patent. Moist oral mucosa.  Ears: Bilateral TMs clear. Bilateral EACs clear.  Cardiovascular: Regular rate. Regular rhythm. No murmurs. No rubs. No gallops. Normal S1, S2.  Respiratory: Normal respiratory effort. Clear to  auscultation bilaterally. No rales. No rhonchi. No wheezing.  Abdomen: Soft. Non-tender. Non-distended. Normoactive bowel sounds.  Musculoskeletal: No  obvious deformity.  Extremities: No lower extremity edema.  Neurological: Alert & oriented x3. No slurred speech. Normal gait.  Psychiatric: Normal mood. Normal affect. Good insight. Good judgment.  Skin: Warm. Dry. No rash.         Assessment & Plan    Blood pressure currently controlled with ARB diuretic combination  LDL cholesterol above 100, improving; discussed potential need for statins in the future  Generalized anxiety controlled without medications; no current need for treatment  Recommend weight loss to potentially reduce blood pressure medication and inhibit chronic disease development    HYPERTENSION:  - Monitored the patient's blood pressure, which is under control with current medication regimen.  - Evaluated for chest pain or shortness of breath, which were not reported.  - Assessed the potential for discontinuing blood pressure medication through weight loss.  - Continued treatment with ARB diuretic combination.    GENERALIZED ANXIETY DISORDER:  - Monitored the patient's generalized anxiety, which is controlled without medications.  - Evaluated the patient's career, noting it is exciting and full of stress.  - Assessed that no treatment is needed at this time.    WEIGHT MANAGEMENT:  - Instructed the patient to contact [employer] to inquire about Zepbound for weight loss.  - Explained the relationship between weight loss and chronic disease prevention.  - Discussed weight loss opportunities to prevent chronic diseases and reduce blood pressure medication.  - Recommend lowering saturated fats, reducing caloric intake, and increasing exercise.  - Sam to lower saturated fat intake.  - Sam to reduce calorie consumption.  - Sam to increase exercise.    HYPERLIPIDEMIA:  - Discussed cholesterol metabolism and its implications with the patient.  -  Monitored LDL cholesterol, which is above 100 with an upward trend.  - Evaluated previous labs, which were normal.  - Assessed the potential need for statins.  - Scheduled repeat labs in April 2025 to reassess LDL cholesterol and revisit the topic.    IMMUNIZATIONS:  - Assessed that immunizations are needed.  - Noted required immunizations in the wrap-up section of the chart.    FOLLOW UP:  - Recommend follow up in 6 months.  - Follow up in 6 months.            Follow up in about 6 months (around 4/4/2025).    This note was generated with the assistance of ambient listening technology. Verbal consent was obtained by the patient and accompanying visitor(s) for the recording of patient appointment to facilitate this note. I attest to having reviewed and edited the generated note for accuracy, though some syntax or spelling errors may persist. Please contact the author of this note for any clarification.

## 2024-10-04 NOTE — PATIENT INSTRUCTIONS
Contact [employer] concerning Zepbound.    Ask your pharmacist about the shingles vaccine and also completion of the COVID-19 vaccine series

## 2024-12-28 DIAGNOSIS — I10 ESSENTIAL HYPERTENSION: ICD-10-CM

## 2024-12-28 NOTE — TELEPHONE ENCOUNTER
No care due was identified.  Health [facility] Embedded Care Due Messages. Reference number: [id number].   12/28/2024 8:08:31 AM CST

## 2024-12-29 RX ORDER — LOSARTAN POTASSIUM AND HYDROCHLOROTHIAZIDE 12.5; 5 MG/1; MG/1
1 TABLET ORAL
Qty: 90 TABLET | Refills: 1 | Status: SHIPPED | OUTPATIENT
Start: 2024-12-29

## 2024-12-29 NOTE — TELEPHONE ENCOUNTER
Refill Decision Note   Sam Morse  is requesting a refill authorization.  Brief Assessment and Rationale for Refill:  Approve     Medication Therapy Plan:         Comments:     Note composed:3:58 PM 12/29/2024             Appointments     Last Visit   10/4/2024 Sam Lawrence MD   Next Visit   4/4/2025 Sam Lawrence MD

## 2025-04-04 ENCOUNTER — OFFICE VISIT (OUTPATIENT)
Dept: FAMILY MEDICINE | Facility: CLINIC | Age: 55
End: 2025-04-04
Payer: OTHER GOVERNMENT

## 2025-04-04 VITALS
BODY MASS INDEX: 37.78 KG/M2 | WEIGHT: 255.06 LBS | HEART RATE: 65 BPM | SYSTOLIC BLOOD PRESSURE: 128 MMHG | DIASTOLIC BLOOD PRESSURE: 82 MMHG | HEIGHT: 69 IN | OXYGEN SATURATION: 98 %

## 2025-04-04 DIAGNOSIS — I10 ESSENTIAL HYPERTENSION: ICD-10-CM

## 2025-04-04 DIAGNOSIS — Z00.00 WELLNESS EXAMINATION: Primary | ICD-10-CM

## 2025-04-04 DIAGNOSIS — E66.01 SEVERE OBESITY (BMI 35.0-39.9) WITH COMORBIDITY: ICD-10-CM

## 2025-04-04 PROCEDURE — 99999 PR PBB SHADOW E&M-EST. PATIENT-LVL III: CPT | Mod: PBBFAC,,, | Performed by: FAMILY MEDICINE

## 2025-04-04 PROCEDURE — 99213 OFFICE O/P EST LOW 20 MIN: CPT | Mod: PBBFAC,PO | Performed by: FAMILY MEDICINE

## 2025-04-04 RX ORDER — LOSARTAN POTASSIUM 25 MG/1
12.5 TABLET ORAL
COMMUNITY
End: 2025-04-04

## 2025-04-04 NOTE — PROGRESS NOTES
Patient ID: Sam Morse is a 54 y.o. male.    Chief Complaint: Follow-up (6 month follow up)    History of Present Illness    CHIEF COMPLAINT:  Patient presents today for annual exam    GENERAL HEALTH:  He reports feeling fine overall.    MEDICATIONS:  He is currently on an antiretroviral (ARV) and diuretic combination therapy.      ROS:  General: -fever, -chills, -fatigue, -weight gain, -weight loss  Eyes: -vision changes, -redness, -discharge  ENT: -ear pain, -nasal congestion, -sore throat  Cardiovascular: -chest pain, -palpitations, -lower extremity edema  Respiratory: -cough, -shortness of breath  Gastrointestinal: -abdominal pain, -nausea, -vomiting, -diarrhea, -constipation, -blood in stool  Genitourinary: -dysuria, -hematuria, -frequency  Musculoskeletal: -joint pain, -muscle pain  Skin: -rash, -lesion  Neurological: -headache, -dizziness, -numbness, -tingling  Psychiatric: -anxiety, -depression, -sleep difficulty         Physical Exam    Vitals: BMI: ___. Obese.  General: No acute distress. Well-developed. Well-nourished.  Eyes: EOMI. Sclerae anicteric.  HENT: Normocephalic. Atraumatic. Nares patent. Moist oral mucosa.  Ears: Bilateral TMs clear. Bilateral EACs clear.  Cardiovascular: Regular rate. Regular rhythm. No murmurs. No rubs. No gallops. Normal S1, S2.  Respiratory: Normal respiratory effort. Clear to auscultation bilaterally. No rales. No rhonchi. No wheezing.  Abdomen: Soft. Non-tender. Non-distended. Normoactive bowel sounds.  Musculoskeletal: No  obvious deformity.  Extremities: No lower extremity edema.  Neurological: Alert & oriented x3. No slurred speech. Normal gait.  Psychiatric: Normal mood. Normal affect. Good insight. Good judgment.  Skin: Warm. Dry. No rash.         Assessment & Plan    E66.9 Obesity, unspecified    IMPRESSION:  - BP well-controlled without overt symptoms of CD or CHF.  - No reported chest pain, shortness of breath, or peripheral edema.  - Current ARV and diuretic  combination effective; continuation recommended.  - BMI elevated at 37.6; considering ZAP bound for weight management when approved.  - Appetite suppressants effective, but recommending portion control as primary strategy.    OBESITY:  - Calculated the patient's BMI as 37.6, indicating obesity.  - Acknowledged that appetite suppressants have been working fairly well.  - Recommend lowering portion sizes for weight management.  - Patient to lower portion sizes for meals.  - Ordered labs including CBC, CMP, lipid panel, PSA, and Hemoglobin A1c to monitor obesity-related health issues.  - Considered future treatment with ZAP bound.    FOLLOW-UP:  - Advised the patient to follow up after receiving lab results for further evaluation and management.            Follow up in about 1 year (around 4/4/2026).    This note was generated with the assistance of ambient listening technology. Verbal consent was obtained by the patient and accompanying visitor(s) for the recording of patient appointment to facilitate this note. I attest to having reviewed and edited the generated note for accuracy, though some syntax or spelling errors may persist. Please contact the author of this note for any clarification.

## 2025-04-22 ENCOUNTER — RESULTS FOLLOW-UP (OUTPATIENT)
Dept: FAMILY MEDICINE | Facility: CLINIC | Age: 55
End: 2025-04-22

## 2025-04-22 ENCOUNTER — LAB VISIT (OUTPATIENT)
Dept: LAB | Facility: HOSPITAL | Age: 55
End: 2025-04-22
Attending: FAMILY MEDICINE
Payer: OTHER GOVERNMENT

## 2025-04-22 DIAGNOSIS — Z00.00 WELLNESS EXAMINATION: ICD-10-CM

## 2025-04-22 DIAGNOSIS — E66.01 SEVERE OBESITY (BMI 35.0-39.9) WITH COMORBIDITY: ICD-10-CM

## 2025-04-22 DIAGNOSIS — I10 ESSENTIAL HYPERTENSION: ICD-10-CM

## 2025-04-22 LAB
ABSOLUTE EOSINOPHIL (OHS): 0.13 K/UL
ABSOLUTE MONOCYTE (OHS): 0.45 K/UL (ref 0.3–1)
ABSOLUTE NEUTROPHIL COUNT (OHS): 3.92 K/UL (ref 1.8–7.7)
ALBUMIN SERPL BCP-MCNC: 4.1 G/DL (ref 3.5–5.2)
ALP SERPL-CCNC: 51 UNIT/L (ref 40–150)
ALT SERPL W/O P-5'-P-CCNC: 44 UNIT/L (ref 10–44)
ANION GAP (OHS): 9 MMOL/L (ref 8–16)
AST SERPL-CCNC: 23 UNIT/L (ref 11–45)
BASOPHILS # BLD AUTO: 0.03 K/UL
BASOPHILS NFR BLD AUTO: 0.4 %
BILIRUB SERPL-MCNC: 0.5 MG/DL (ref 0.1–1)
BUN SERPL-MCNC: 16 MG/DL (ref 6–20)
CALCIUM SERPL-MCNC: 9.2 MG/DL (ref 8.7–10.5)
CHLORIDE SERPL-SCNC: 105 MMOL/L (ref 95–110)
CHOLEST SERPL-MCNC: 189 MG/DL (ref 120–199)
CHOLEST/HDLC SERPL: 5.4 {RATIO} (ref 2–5)
CO2 SERPL-SCNC: 25 MMOL/L (ref 23–29)
CREAT SERPL-MCNC: 1.1 MG/DL (ref 0.5–1.4)
EAG (OHS): 117 MG/DL (ref 68–131)
ERYTHROCYTE [DISTWIDTH] IN BLOOD BY AUTOMATED COUNT: 12 % (ref 11.5–14.5)
GFR SERPLBLD CREATININE-BSD FMLA CKD-EPI: >60 ML/MIN/1.73/M2
GLUCOSE SERPL-MCNC: 115 MG/DL (ref 70–110)
HBA1C MFR BLD: 5.7 % (ref 4–5.6)
HCT VFR BLD AUTO: 48.4 % (ref 40–54)
HDLC SERPL-MCNC: 35 MG/DL (ref 40–75)
HDLC SERPL: 18.5 % (ref 20–50)
HGB BLD-MCNC: 15.9 GM/DL (ref 14–18)
IMM GRANULOCYTES # BLD AUTO: 0.01 K/UL (ref 0–0.04)
IMM GRANULOCYTES NFR BLD AUTO: 0.1 % (ref 0–0.5)
LDLC SERPL CALC-MCNC: 116.2 MG/DL (ref 63–159)
LYMPHOCYTES # BLD AUTO: 2.16 K/UL (ref 1–4.8)
MCH RBC QN AUTO: 30.2 PG (ref 27–31)
MCHC RBC AUTO-ENTMCNC: 32.9 G/DL (ref 32–36)
MCV RBC AUTO: 92 FL (ref 82–98)
NONHDLC SERPL-MCNC: 154 MG/DL
NUCLEATED RBC (/100WBC) (OHS): 0 /100 WBC
PLATELET # BLD AUTO: 198 K/UL (ref 150–450)
PMV BLD AUTO: 10.8 FL (ref 9.2–12.9)
POTASSIUM SERPL-SCNC: 4.3 MMOL/L (ref 3.5–5.1)
PROT SERPL-MCNC: 6.8 GM/DL (ref 6–8.4)
PSA SERPL-MCNC: 0.79 NG/ML
RBC # BLD AUTO: 5.26 M/UL (ref 4.6–6.2)
RELATIVE EOSINOPHIL (OHS): 1.9 %
RELATIVE LYMPHOCYTE (OHS): 32.2 % (ref 18–48)
RELATIVE MONOCYTE (OHS): 6.7 % (ref 4–15)
RELATIVE NEUTROPHIL (OHS): 58.7 % (ref 38–73)
SODIUM SERPL-SCNC: 139 MMOL/L (ref 136–145)
TRIGL SERPL-MCNC: 189 MG/DL (ref 30–150)
WBC # BLD AUTO: 6.7 K/UL (ref 3.9–12.7)

## 2025-04-22 PROCEDURE — 83036 HEMOGLOBIN GLYCOSYLATED A1C: CPT

## 2025-04-22 PROCEDURE — 80061 LIPID PANEL: CPT

## 2025-04-22 PROCEDURE — 80053 COMPREHEN METABOLIC PANEL: CPT

## 2025-04-22 PROCEDURE — 85025 COMPLETE CBC W/AUTO DIFF WBC: CPT

## 2025-04-22 PROCEDURE — 36415 COLL VENOUS BLD VENIPUNCTURE: CPT | Mod: PN

## 2025-04-22 PROCEDURE — 84153 ASSAY OF PSA TOTAL: CPT

## 2025-08-03 DIAGNOSIS — I10 ESSENTIAL HYPERTENSION: ICD-10-CM

## 2025-08-03 NOTE — TELEPHONE ENCOUNTER
No care due was identified.  Health [facility] Embedded Care Due Messages. Reference number: [id number].   8/03/2025 8:08:28 AM CDT

## 2025-08-04 RX ORDER — LOSARTAN POTASSIUM AND HYDROCHLOROTHIAZIDE 12.5; 5 MG/1; MG/1
1 TABLET ORAL
Qty: 90 TABLET | Refills: 2 | Status: SHIPPED | OUTPATIENT
Start: 2025-08-04

## 2025-08-04 NOTE — TELEPHONE ENCOUNTER
Refill Decision Note   Sam Morse  is requesting a refill authorization.  Brief Assessment and Rationale for Refill:  Approve     Medication Therapy Plan:        Comments:     Note composed:9:31 AM 08/04/2025